# Patient Record
Sex: MALE | Race: WHITE | NOT HISPANIC OR LATINO | Employment: OTHER | URBAN - NONMETROPOLITAN AREA
[De-identification: names, ages, dates, MRNs, and addresses within clinical notes are randomized per-mention and may not be internally consistent; named-entity substitution may affect disease eponyms.]

---

## 2017-02-09 DIAGNOSIS — R97.20 ELEVATED PROSTATE SPECIFIC ANTIGEN (PSA): Primary | ICD-10-CM

## 2017-02-09 LAB — PSA SERPL-MCNC: 5.31 NG/ML (ref 0–4)

## 2017-02-14 ENCOUNTER — RESULTS ENCOUNTER (OUTPATIENT)
Dept: UROLOGY | Facility: CLINIC | Age: 77
End: 2017-02-14

## 2017-02-14 DIAGNOSIS — R97.20 ELEVATED PROSTATE SPECIFIC ANTIGEN (PSA): ICD-10-CM

## 2017-02-16 ENCOUNTER — OFFICE VISIT (OUTPATIENT)
Dept: UROLOGY | Facility: CLINIC | Age: 77
End: 2017-02-16

## 2017-02-16 VITALS
TEMPERATURE: 97.9 F | BODY MASS INDEX: 24.08 KG/M2 | SYSTOLIC BLOOD PRESSURE: 120 MMHG | WEIGHT: 162.6 LBS | HEIGHT: 69 IN | DIASTOLIC BLOOD PRESSURE: 68 MMHG

## 2017-02-16 DIAGNOSIS — R97.20 ELEVATED PROSTATE SPECIFIC ANTIGEN (PSA): Primary | ICD-10-CM

## 2017-02-16 DIAGNOSIS — N13.8 BPH (BENIGN PROSTATIC HYPERTROPHY) WITH URINARY OBSTRUCTION: ICD-10-CM

## 2017-02-16 DIAGNOSIS — N40.1 BPH (BENIGN PROSTATIC HYPERTROPHY) WITH URINARY OBSTRUCTION: ICD-10-CM

## 2017-02-16 LAB
BILIRUB BLD-MCNC: NEGATIVE MG/DL
CLARITY, POC: CLEAR
COLOR UR: YELLOW
GLUCOSE UR STRIP-MCNC: NEGATIVE MG/DL
KETONES UR QL: NEGATIVE
LEUKOCYTE EST, POC: NEGATIVE
NITRITE UR-MCNC: NEGATIVE MG/ML
PH UR: 7 [PH] (ref 5–8)
PROT UR STRIP-MCNC: NEGATIVE MG/DL
RBC # UR STRIP: NEGATIVE /UL
SP GR UR: 1.02 (ref 1–1.03)
UROBILINOGEN UR QL: NORMAL

## 2017-02-16 PROCEDURE — 99213 OFFICE O/P EST LOW 20 MIN: CPT | Performed by: UROLOGY

## 2017-02-16 PROCEDURE — 81003 URINALYSIS AUTO W/O SCOPE: CPT | Performed by: UROLOGY

## 2017-02-16 RX ORDER — HYDROCHLOROTHIAZIDE 25 MG/1
25 TABLET ORAL DAILY
COMMUNITY

## 2017-02-16 RX ORDER — LOVASTATIN 20 MG/1
20 TABLET ORAL NIGHTLY
COMMUNITY

## 2017-02-16 RX ORDER — FOSINOPRIL SODIUM 40 MG/1
40 TABLET ORAL DAILY
COMMUNITY

## 2017-02-16 RX ORDER — ASPIRIN 81 MG/1
81 TABLET ORAL EVERY OTHER DAY
COMMUNITY

## 2017-02-16 NOTE — PROGRESS NOTES
Subjective    Mr. Granados is 76 y.o. male    CHIEF COMPLAINT: Elevated PSA    The patient comes back today with his family for follow-up of elevated PSA he had a biopsy back in August of last year that showed benign disease since then he is had no significant change in his symptoms no urgency no frequency no gross hematuria no flank pain a repeat PSA is 5.3 so basically stable from prebiopsy    We are also following him for BPH he is had no worsening of his symptoms since we have last seen he again no flank pain no gross hematuria no urinary tract infections no significant change in urgency frequency etc. he is not on any medications from for his prostate does not feel that he needs anything at this time  History of Present Illness      The following portions of the patient's history were reviewed and updated as appropriate: allergies, current medications, past family history, past medical history, past social history, past surgical history and problem list.    Review of Systems   Constitutional: Negative for chills and fever.   Gastrointestinal: Negative for abdominal pain, anal bleeding and blood in stool.   Genitourinary: Negative for difficulty urinating, flank pain, frequency, hematuria, penile pain, penile swelling and urgency.         Current Outpatient Prescriptions:   •  aspirin 81 MG EC tablet, Take 81 mg by mouth Daily., Disp: , Rfl:   •  Calcium Citrate-Vitamin D (CALCIUM + D PO), Take  by mouth., Disp: , Rfl:   •  Cholecalciferol (VITAMIN D3) 1000 UNITS capsule, Take  by mouth., Disp: , Rfl:   •  Cyanocobalamin (VITAMIN B-12 IJ), Inject  as directed., Disp: , Rfl:   •  fosinopril (MONOPRIL) 40 MG tablet, Take 40 mg by mouth Daily., Disp: , Rfl:   •  hydrochlorothiazide (HYDRODIURIL) 25 MG tablet, Take 25 mg by mouth Daily., Disp: , Rfl:   •  LEVOTHYROXINE SODIUM PO, Take  by mouth., Disp: , Rfl:   •  lovastatin (MEVACOR) 20 MG tablet, Take 20 mg by mouth Every Night., Disp: , Rfl:   •  Multiple  "Vitamins-Minerals (OCUVITE PO), Take  by mouth., Disp: , Rfl:     Past Medical History   Diagnosis Date   • Arthritis    • BPH (benign prostatic hypertrophy)    • Elevated PSA    • Hyperlipemia    • Hypertension    • Hypothyroidism    • Impotence of organic origin    • Pernicious anemia        Past Surgical History   Procedure Laterality Date   • Cataract extraction     • Splenectomy     • Hernia repair     • Colonoscopy         Social History     Social History   • Marital status: Unknown     Spouse name: N/A   • Number of children: N/A   • Years of education: N/A     Social History Main Topics   • Smoking status: Never Smoker   • Smokeless tobacco: None   • Alcohol use No   • Drug use: None   • Sexual activity: Not Asked     Other Topics Concern   • None     Social History Narrative   • None       Family History   Problem Relation Age of Onset   • No Known Problems Father    • No Known Problems Mother        Objective    Visit Vitals   • /68   • Temp 97.9 °F (36.6 °C)   • Ht 69\" (175.3 cm)   • Wt 162 lb 9.6 oz (73.8 kg)   • BMI 24.01 kg/m2       Physical Exam      Orders Only on 02/09/2017   Component Date Value Ref Range Status   • PSA 02/09/2017 5.310* 0.000 - 4.000 ng/mL Final       Results for orders placed or performed in visit on 02/16/17   POC Urinalysis Dipstick, Automated   Result Value Ref Range    Color Yellow Yellow, Straw, Dark Yellow, [place]    Clarity, UA Clear Clear    Glucose, UA Negative Negative, 1000 mg/dL (3+) mg/dL    Bilirubin Negative Negative    Ketones, UA Negative Negative    Specific Gravity  1.020 1.005 - 1.030    Blood, UA Negative Negative    pH, Urine 7.0 5.0 - 8.0    Protein, POC Negative Negative mg/dL    Urobilinogen, UA Normal Normal    Leukocytes Negative Negative    Nitrite, UA Negative Negative       Assessment and Plan    Maximo was seen today for elevated psa.    Diagnoses and all orders for this visit:    Elevated prostate specific antigen (PSA)  -     POC Urinalysis " Dipstick, Automated    BPH (benign prostatic hypertrophy) with urinary obstruction    Plan--I discussed the findings again with the patient his family we will see him back here in 6 months with a repeat PSA assuming this is stable does not think we need to yearly exam after that he was currently as he had no further questions.    This is voiding habits should worry worsen then he will let me know when certainly start him on some medication for his prostate    EMR Dragon/Transcription disclaimer:  Much of this encounter note is an electronic transcription/translation of spoken language to printed text. The electronic translation of spoken language may permit erroneous, or at times, nonsensical words or phrases to be inadvertently transcribed; although I have reviewed the note for such errors, some may still exist.

## 2017-02-21 ENCOUNTER — RESULTS ENCOUNTER (OUTPATIENT)
Dept: UROLOGY | Facility: CLINIC | Age: 77
End: 2017-02-21

## 2017-02-21 DIAGNOSIS — R97.20 ELEVATED PROSTATE SPECIFIC ANTIGEN (PSA): ICD-10-CM

## 2017-05-22 ENCOUNTER — TELEPHONE (OUTPATIENT)
Dept: UROLOGY | Facility: CLINIC | Age: 77
End: 2017-05-22

## 2017-08-10 LAB — PSA SERPL-MCNC: 5.58 NG/ML (ref 0–4)

## 2017-08-17 ENCOUNTER — OFFICE VISIT (OUTPATIENT)
Dept: UROLOGY | Facility: CLINIC | Age: 77
End: 2017-08-17

## 2017-08-17 VITALS
BODY MASS INDEX: 25.05 KG/M2 | SYSTOLIC BLOOD PRESSURE: 116 MMHG | WEIGHT: 159.6 LBS | DIASTOLIC BLOOD PRESSURE: 76 MMHG | HEIGHT: 67 IN | TEMPERATURE: 98.4 F

## 2017-08-17 DIAGNOSIS — R97.20 ELEVATED PROSTATE SPECIFIC ANTIGEN (PSA): Primary | ICD-10-CM

## 2017-08-17 DIAGNOSIS — N40.1 BPH (BENIGN PROSTATIC HYPERTROPHY) WITH URINARY OBSTRUCTION: ICD-10-CM

## 2017-08-17 DIAGNOSIS — N13.8 BPH (BENIGN PROSTATIC HYPERTROPHY) WITH URINARY OBSTRUCTION: ICD-10-CM

## 2017-08-17 LAB
BILIRUB BLD-MCNC: NEGATIVE MG/DL
CLARITY, POC: CLEAR
COLOR UR: YELLOW
GLUCOSE UR STRIP-MCNC: NEGATIVE MG/DL
KETONES UR QL: NEGATIVE
LEUKOCYTE EST, POC: NEGATIVE
NITRITE UR-MCNC: NEGATIVE MG/ML
PH UR: 6 [PH] (ref 5–8)
PROT UR STRIP-MCNC: NEGATIVE MG/DL
RBC # UR STRIP: NEGATIVE /UL
SP GR UR: 1.02 (ref 1–1.03)
UROBILINOGEN UR QL: NORMAL

## 2017-08-17 PROCEDURE — 99213 OFFICE O/P EST LOW 20 MIN: CPT | Performed by: UROLOGY

## 2017-08-17 PROCEDURE — 81003 URINALYSIS AUTO W/O SCOPE: CPT | Performed by: UROLOGY

## 2017-08-17 NOTE — PROGRESS NOTES
Subjective    Mr. Granados is 76 y.o. male    CHIEF COMPLAINT: Elevated PSA    The patient comes back today following a biopsy for prostate elevation of the PSA this was done about a year ago now at that time his PSA was around 5.2 PSA today is 5.6.    He denies any significant changes in his symptoms no gross materia no flank pain no urgency frequency nocturia or any change in them he is not any medications for his prostate is had no urinary tract infections symptoms of flank pain etc.      History of Present Illness      The following portions of the patient's history were reviewed and updated as appropriate: allergies, current medications, past family history, past medical history, past social history, past surgical history and problem list.    Review of Systems   Constitutional: Negative.  Negative for chills and fever.   Gastrointestinal: Negative for abdominal distention, abdominal pain, anal bleeding, blood in stool and nausea.   Genitourinary: Negative for difficulty urinating, dysuria, flank pain, frequency, hematuria and urgency.   Psychiatric/Behavioral: Negative.  Negative for agitation and confusion.         Current Outpatient Prescriptions:   •  aspirin 81 MG EC tablet, Take 81 mg by mouth Daily., Disp: , Rfl:   •  Calcium Citrate-Vitamin D (CALCIUM + D PO), Take  by mouth., Disp: , Rfl:   •  Cholecalciferol (VITAMIN D3) 1000 UNITS capsule, Take  by mouth., Disp: , Rfl:   •  Cyanocobalamin (VITAMIN B-12 IJ), Inject  as directed., Disp: , Rfl:   •  fosinopril (MONOPRIL) 40 MG tablet, Take 40 mg by mouth Daily., Disp: , Rfl:   •  hydrochlorothiazide (HYDRODIURIL) 25 MG tablet, Take 25 mg by mouth Daily., Disp: , Rfl:   •  LEVOTHYROXINE SODIUM PO, Take  by mouth., Disp: , Rfl:   •  lovastatin (MEVACOR) 20 MG tablet, Take 20 mg by mouth Every Night., Disp: , Rfl:   •  Multiple Vitamins-Minerals (OCUVITE PO), Take  by mouth., Disp: , Rfl:     Past Medical History:   Diagnosis Date   • Arthritis    • BPH  "(benign prostatic hypertrophy)    • Elevated PSA    • Hyperlipemia    • Hypertension    • Hypothyroidism    • Impotence of organic origin    • Pernicious anemia        Past Surgical History:   Procedure Laterality Date   • CATARACT EXTRACTION     • COLONOSCOPY     • HERNIA REPAIR     • SPLENECTOMY         Social History     Social History   • Marital status: Unknown     Spouse name: N/A   • Number of children: N/A   • Years of education: N/A     Social History Main Topics   • Smoking status: Never Smoker   • Smokeless tobacco: Never Used   • Alcohol use No   • Drug use: Defer   • Sexual activity: Defer     Other Topics Concern   • None     Social History Narrative       Family History   Problem Relation Age of Onset   • No Known Problems Father    • No Known Problems Mother        Objective    /76  Temp 98.4 °F (36.9 °C)  Ht 67\" (170.2 cm)  Wt 159 lb 9.6 oz (72.4 kg)  BMI 25 kg/m2    Physical Exam      Office Visit on 02/16/2017   Component Date Value Ref Range Status   • Color 02/16/2017 Yellow  Yellow, Straw, Dark Yellow, [place] Final   • Clarity, UA 02/16/2017 Clear  Clear Final   • Glucose, UA 02/16/2017 Negative  Negative, 1000 mg/dL (3+) mg/dL Final   • Bilirubin 02/16/2017 Negative  Negative Final   • Ketones, UA 02/16/2017 Negative  Negative Final   • Specific Gravity  02/16/2017 1.020  1.005 - 1.030 Final   • Blood, UA 02/16/2017 Negative  Negative Final   • pH, Urine 02/16/2017 7.0  5.0 - 8.0 Final   • Protein, POC 02/16/2017 Negative  Negative mg/dL Final   • Urobilinogen, UA 02/16/2017 Normal  Normal Final   • Leukocytes 02/16/2017 Negative  Negative Final   • Nitrite, UA 02/16/2017 Negative  Negative Final       Results for orders placed or performed in visit on 08/17/17   POC Urinalysis Dipstick, Automated   Result Value Ref Range    Color Yellow Yellow, Straw, Dark Yellow, [place]    Clarity, UA Clear Clear    Glucose, UA Negative Negative, 1000 mg/dL (3+) mg/dL    Bilirubin Negative Negative "    Ketones, UA Negative Negative    Specific Gravity  1.020 1.005 - 1.030    Blood, UA Negative Negative    pH, Urine 6.0 5.0 - 8.0    Protein, POC Negative Negative mg/dL    Urobilinogen, UA Normal Normal    Leukocytes Negative Negative    Nitrite, UA Negative Negative       Assessment and Plan    Maximo was seen today for elevated psa.    Diagnoses and all orders for this visit:    Elevated prostate specific antigen (PSA)  -     POC Urinalysis Dipstick, Automated  -     PSA; Future    BPH (benign prostatic hypertrophy) with urinary obstruction  -     POC Urinalysis Dipstick, Automated  Plan--I discussed the PSA with the patient his wife is a little bit higher than it was on the biopsy for really just a minimal amount I do not think anything significant discussed just continued observation with repeat PSA in 6 months I think both of them are comfortable with this and had no further questions at this time.    From his BPH symptoms of patient worsen we can certainly increase his or start him on some Flomax he will let me know

## 2017-08-22 ENCOUNTER — RESULTS ENCOUNTER (OUTPATIENT)
Dept: UROLOGY | Facility: CLINIC | Age: 77
End: 2017-08-22

## 2017-08-22 DIAGNOSIS — R97.20 ELEVATED PROSTATE SPECIFIC ANTIGEN (PSA): ICD-10-CM

## 2018-02-12 LAB — PSA SERPL-MCNC: 6.71 NG/ML (ref 0–4)

## 2018-02-19 ENCOUNTER — OFFICE VISIT (OUTPATIENT)
Dept: UROLOGY | Facility: CLINIC | Age: 78
End: 2018-02-19

## 2018-02-19 VITALS
BODY MASS INDEX: 24.05 KG/M2 | SYSTOLIC BLOOD PRESSURE: 140 MMHG | HEIGHT: 69 IN | DIASTOLIC BLOOD PRESSURE: 68 MMHG | TEMPERATURE: 98.7 F | WEIGHT: 162.4 LBS

## 2018-02-19 DIAGNOSIS — R97.20 ELEVATED PROSTATE SPECIFIC ANTIGEN (PSA): Primary | ICD-10-CM

## 2018-02-19 DIAGNOSIS — N40.1 BENIGN PROSTATIC HYPERPLASIA WITH LOWER URINARY TRACT SYMPTOMS, SYMPTOM DETAILS UNSPECIFIED: ICD-10-CM

## 2018-02-19 LAB
BILIRUB BLD-MCNC: NEGATIVE MG/DL
CLARITY, POC: CLEAR
COLOR UR: YELLOW
GLUCOSE UR STRIP-MCNC: NEGATIVE MG/DL
KETONES UR QL: NEGATIVE
LEUKOCYTE EST, POC: ABNORMAL
NITRITE UR-MCNC: NEGATIVE MG/ML
PH UR: 6.5 [PH] (ref 5–8)
PROT UR STRIP-MCNC: NEGATIVE MG/DL
RBC # UR STRIP: NEGATIVE /UL
SP GR UR: 1.02 (ref 1–1.03)
UROBILINOGEN UR QL: NORMAL

## 2018-02-19 PROCEDURE — 99214 OFFICE O/P EST MOD 30 MIN: CPT | Performed by: UROLOGY

## 2018-02-19 PROCEDURE — 81003 URINALYSIS AUTO W/O SCOPE: CPT | Performed by: UROLOGY

## 2018-02-19 NOTE — PROGRESS NOTES
Subjective    Mr. Granados is 77 y.o. male    CHIEF COMPLAINT: Elevated PSA    The patient comes back today for follow-up of elevated PSA he did have a biopsy about a year and a half ago now that was benign at that time his PSA was in the low 5 range and is now 6.7.    He denies any indigestion change in his BPH symptoms such as gross hematuria no flank pain no recurrent urinary tract infections he does not take any medications for his prostate again no significant changes since we have last seen him      History of Present Illness      The following portions of the patient's history were reviewed and updated as appropriate: allergies, current medications, past family history, past medical history, past social history, past surgical history and problem list.    Review of Systems   Constitutional: Negative.  Negative for chills and fever.   Gastrointestinal: Negative for abdominal distention, abdominal pain, anal bleeding, blood in stool and nausea.   Genitourinary: Negative for difficulty urinating, dysuria, flank pain, frequency, hematuria and urgency.   Psychiatric/Behavioral: Negative.  Negative for agitation and confusion.         Current Outpatient Prescriptions:   •  aspirin 81 MG EC tablet, Take 81 mg by mouth Daily., Disp: , Rfl:   •  Calcium Citrate-Vitamin D (CALCIUM + D PO), Take  by mouth., Disp: , Rfl:   •  Cholecalciferol (VITAMIN D3) 1000 UNITS capsule, Take  by mouth., Disp: , Rfl:   •  Cyanocobalamin (VITAMIN B-12 IJ), Inject  as directed., Disp: , Rfl:   •  fosinopril (MONOPRIL) 40 MG tablet, Take 40 mg by mouth Daily., Disp: , Rfl:   •  hydrochlorothiazide (HYDRODIURIL) 25 MG tablet, Take 25 mg by mouth Daily., Disp: , Rfl:   •  LEVOTHYROXINE SODIUM PO, Take  by mouth., Disp: , Rfl:   •  lovastatin (MEVACOR) 20 MG tablet, Take 20 mg by mouth Every Night., Disp: , Rfl:   •  Multiple Vitamins-Minerals (OCUVITE PO), Take  by mouth., Disp: , Rfl:     Past Medical History:   Diagnosis Date   • Arthritis   "  • BPH (benign prostatic hypertrophy)    • Elevated PSA    • Hyperlipemia    • Hypertension    • Hypothyroidism    • Impotence of organic origin    • Pernicious anemia        Past Surgical History:   Procedure Laterality Date   • CATARACT EXTRACTION     • COLONOSCOPY     • HERNIA REPAIR     • SPLENECTOMY         Social History     Social History   • Marital status: Unknown     Spouse name: N/A   • Number of children: N/A   • Years of education: N/A     Social History Main Topics   • Smoking status: Never Smoker   • Smokeless tobacco: Never Used   • Alcohol use No   • Drug use: Defer   • Sexual activity: Defer     Other Topics Concern   • None     Social History Narrative       Family History   Problem Relation Age of Onset   • No Known Problems Father    • No Known Problems Mother        Objective    /68  Temp 98.7 °F (37.1 °C)  Ht 175.3 cm (69\")  Wt 73.7 kg (162 lb 6.4 oz)  BMI 23.98 kg/m2    Physical Exam   Constitutional: He is oriented to person, place, and time. He appears well-developed and well-nourished.   Pulmonary/Chest: Effort normal.   Abdominal: Soft. He exhibits no distension and no mass. There is no tenderness. There is no rebound and no guarding. No hernia.   Genitourinary: Penis normal. Rectal exam shows no mass, no tenderness and anal tone normal. Enlarged: for the age of the patient. Right testis shows no mass, no swelling and no tenderness. Left testis shows no mass, no swelling and no tenderness. No hypospadias. No discharge found.   Genitourinary Comments:  The urethral meatus normal in position without evidence of stricture. Epididymis without mass or tenderness. Vas Deferens is palpably normal.Anus and perineum without mass or tenderness. The prostate is approximately 35 ml. It is Symmetric, with a Soft consistency. There are no nodules present. . The seminal vesicles are Not palpable due to the size of the prostate.     Neurological: He is alert and oriented to person, place, and " time.   Vitals reviewed.        Results Encounter on 08/22/2017   Component Date Value Ref Range Status   • PSA 02/12/2018 6.710* 0.000 - 4.000 ng/mL Final       Results for orders placed or performed in visit on 02/19/18   POC Urinalysis Dipstick, Automated   Result Value Ref Range    Color Yellow Yellow, Straw, Dark Yellow, [place]    Clarity, UA Clear Clear    Glucose, UA Negative Negative, 1000 mg/dL (3+) mg/dL    Bilirubin Negative Negative    Ketones, UA Negative Negative    Specific Gravity  1.020 1.005 - 1.030    Blood, UA Negative Negative    pH, Urine 6.5 5.0 - 8.0    Protein, POC Negative Negative mg/dL    Urobilinogen, UA Normal Normal    Leukocytes Trace (A) Negative    Nitrite, UA Negative Negative       Assessment and Plan    Maximo was seen today for elevated psa.    Diagnoses and all orders for this visit:    Elevated prostate specific antigen (PSA)  -     POC Urinalysis Dipstick, Automated  -     PSA, Total & Free; Future    Benign prostatic hyperplasia with lower urinary tract symptoms, symptom details unspecified  Plan--I discussed the rise in the PSA with the patient and his wife again is a little bit higher than it has been before he is 77 years old and and we have done a biopsy relatively recently.    We have discussed the options and I suggested we check him again in 6 months if it continues to rise and we might want to do a MRI and a possible fused biopsy if necessary.    I discussed this with them they are in agreement with that and had no further questions at this time so we will seen back in a  6 months with a PSA free and total.    If his BPH symptoms change or worsen once again he will call as well

## 2018-02-24 ENCOUNTER — RESULTS ENCOUNTER (OUTPATIENT)
Dept: UROLOGY | Facility: CLINIC | Age: 78
End: 2018-02-24

## 2018-02-24 DIAGNOSIS — R97.20 ELEVATED PROSTATE SPECIFIC ANTIGEN (PSA): ICD-10-CM

## 2018-08-16 LAB
PSA FREE MFR SERPL: 11.4 %
PSA FREE SERPL-MCNC: 1 NG/ML
PSA SERPL-MCNC: 8.8 NG/ML (ref 0–4)

## 2018-08-21 ENCOUNTER — OFFICE VISIT (OUTPATIENT)
Dept: UROLOGY | Facility: CLINIC | Age: 78
End: 2018-08-21

## 2018-08-21 VITALS — HEIGHT: 65 IN | BODY MASS INDEX: 25.86 KG/M2 | WEIGHT: 155.2 LBS | TEMPERATURE: 98.2 F

## 2018-08-21 DIAGNOSIS — R97.20 ELEVATED PROSTATE SPECIFIC ANTIGEN (PSA): Primary | ICD-10-CM

## 2018-08-21 DIAGNOSIS — N40.1 BENIGN PROSTATIC HYPERPLASIA WITH LOWER URINARY TRACT SYMPTOMS, SYMPTOM DETAILS UNSPECIFIED: ICD-10-CM

## 2018-08-21 PROCEDURE — 81001 URINALYSIS AUTO W/SCOPE: CPT | Performed by: UROLOGY

## 2018-08-21 PROCEDURE — 99214 OFFICE O/P EST MOD 30 MIN: CPT | Performed by: UROLOGY

## 2018-08-21 NOTE — PROGRESS NOTES
Subjective    Mr. Granados is 77 y.o. male    CHIEF COMPLAINT: Elevated PSA    The patient comes back today with his family for an elevated PSA Dr. Linares in [place] had gotten one and it jumped up to 11.4 we have repeated it today and is back down to 8.4 he has had, URI and an infection recently in fact he says he just stop the medication for it although is not had any symptoms of urinary tract infection.    He did have a biopsy now about 2 years ago that was benign his PSA was in the 5-1/2 range then.    He denies any gross hematuria just a minimal amount of frequency urgency is symptoms are stable he is not any medications for his prostate at this time is had no infection stones etc.          History of Present Illness      The following portions of the patient's history were reviewed and updated as appropriate: allergies, current medications, past family history, past medical history, past social history, past surgical history and problem list.    Review of Systems   Constitutional: Negative.  Negative for chills and fever.   Gastrointestinal: Negative for abdominal distention, abdominal pain, blood in stool and nausea.   Genitourinary: Negative for difficulty urinating, dysuria, flank pain, frequency, hematuria and urgency.   Psychiatric/Behavioral: Negative.  Negative for agitation and confusion.         Current Outpatient Prescriptions:   •  aspirin 81 MG EC tablet, Take 81 mg by mouth Daily., Disp: , Rfl:   •  Calcium Citrate-Vitamin D (CALCIUM + D PO), Take  by mouth., Disp: , Rfl:   •  Cholecalciferol (VITAMIN D3) 1000 UNITS capsule, Take  by mouth., Disp: , Rfl:   •  Cyanocobalamin (VITAMIN B-12 IJ), Inject  as directed., Disp: , Rfl:   •  fosinopril (MONOPRIL) 40 MG tablet, Take 40 mg by mouth Daily., Disp: , Rfl:   •  hydrochlorothiazide (HYDRODIURIL) 25 MG tablet, Take 25 mg by mouth Daily., Disp: , Rfl:   •  LEVOTHYROXINE SODIUM PO, Take  by mouth., Disp: , Rfl:   •  lovastatin (MEVACOR) 20 MG  "tablet, Take 20 mg by mouth Every Night., Disp: , Rfl:   •  Multiple Vitamins-Minerals (OCUVITE PO), Take  by mouth., Disp: , Rfl:     Past Medical History:   Diagnosis Date   • Arthritis    • BPH (benign prostatic hypertrophy)    • Elevated PSA    • Hyperlipemia    • Hypertension    • Hypothyroidism    • Impotence of organic origin    • Pernicious anemia        Past Surgical History:   Procedure Laterality Date   • CATARACT EXTRACTION     • COLONOSCOPY     • HERNIA REPAIR     • SPLENECTOMY         Social History     Social History   • Marital status: Unknown     Social History Main Topics   • Smoking status: Never Smoker   • Smokeless tobacco: Never Used   • Alcohol use No   • Drug use: Unknown   • Sexual activity: Defer     Other Topics Concern   • Not on file       Family History   Problem Relation Age of Onset   • No Known Problems Father    • No Known Problems Mother    • Prostate cancer Neg Hx        Objective    Temp 98.2 °F (36.8 °C)   Ht 165.1 cm (65\")   Wt 70.4 kg (155 lb 3.2 oz)   BMI 25.83 kg/m²     Physical Exam      Results Encounter on 02/24/2018   Component Date Value Ref Range Status   • PSA 08/14/2018 8.8* 0.0 - 4.0 ng/mL Final    Comment: Roche ECLIA methodology.  According to the American [employer] Association, Serum PSA should  decrease and remain at undetectable levels after radical  prostatectomy. The AUA defines biochemical recurrence as an initial  PSA value 0.2 ng/mL or greater followed by a subsequent confirmatory  PSA value 0.2 ng/mL or greater.  Values obtained with different assay methods or kits cannot be used  interchangeably. Results cannot be interpreted as absolute evidence  of the presence or absence of malignant disease.     • PSA, Free 08/14/2018 1.00  N/A ng/mL Final    Roche ECLIA methodology.   • PSA, Free % 08/14/2018 11.4  % Final    Comment: The table below lists the probability of prostate cancer for  men with non-suspicious ANABELLA results and total PSA between  4 and 10 " ng/mL, by patient age (Watson et al, JOEY 1998,  279:1542).                    % Free PSA       50-64 yr        65-75 yr                    0.00-10.00%        56%             55%                   10.01-15.00%        24%             35%                   15.01-20.00%        17%             23%                   20.01-25.00%        10%             20%                        >25.00%         5%              9%  Please note:  Watson et al did not make specific                recommendations regarding the use of                percent free PSA for any other population                of men.         Results for orders placed or performed in visit on 08/21/18   POC Urinalysis Dipstick, Multipro   Result Value Ref Range    Color Yellow Yellow, Straw, Dark Yellow, [place]    Clarity, UA Clear Clear    Glucose, UA Negative Negative, 1000 mg/dL (3+) mg/dL    Bilirubin Negative Negative    Ketones, UA Negative Negative    Specific Gravity  1.020 1.005 - 1.030    Blood, UA Negative Negative    pH, Urine 7.0 5.0 - 8.0    Protein, POC Negative Negative mg/dL    Urobilinogen, UA Normal Normal    Nitrite, UA Negative Negative    Leukocytes Negative Negative       Assessment and Plan    Maximo was seen today for elevated psa.    Diagnoses and all orders for this visit:    Elevated prostate specific antigen (PSA)  -     POC Urinalysis Dipstick, Multipro  -     PSA DIAGNOSTIC; Future    Benign prostatic hyperplasia with lower urinary tract symptoms, symptom details unspecified    Plan--I discussed the PSA with this patient and his wife again I think that may be some inflammation and a platelet roll going to suggest is that we seen back again in 3 months as he was PSA will come back down to baseline.    If it will not come and go to suggest probably an MRI and possible repeat biopsy there comfortable this he had no further questions

## 2018-11-19 ENCOUNTER — RESULTS ENCOUNTER (OUTPATIENT)
Dept: UROLOGY | Facility: CLINIC | Age: 78
End: 2018-11-19

## 2018-11-19 DIAGNOSIS — R97.20 ELEVATED PROSTATE SPECIFIC ANTIGEN (PSA): ICD-10-CM

## 2018-11-19 LAB — PSA SERPL-MCNC: 7.37 NG/ML (ref 0–4)

## 2018-11-27 ENCOUNTER — OFFICE VISIT (OUTPATIENT)
Dept: UROLOGY | Facility: CLINIC | Age: 78
End: 2018-11-27

## 2018-11-27 VITALS — HEIGHT: 67 IN | BODY MASS INDEX: 25.02 KG/M2 | TEMPERATURE: 98.1 F | WEIGHT: 159.4 LBS

## 2018-11-27 DIAGNOSIS — N40.1 BENIGN PROSTATIC HYPERPLASIA WITH LOWER URINARY TRACT SYMPTOMS, SYMPTOM DETAILS UNSPECIFIED: ICD-10-CM

## 2018-11-27 DIAGNOSIS — N52.9 IMPOTENCE DUE TO ERECTILE DYSFUNCTION: ICD-10-CM

## 2018-11-27 DIAGNOSIS — R97.20 ELEVATED PROSTATE SPECIFIC ANTIGEN (PSA): Primary | ICD-10-CM

## 2018-11-27 LAB
BILIRUB BLD-MCNC: NEGATIVE MG/DL
CLARITY, POC: CLEAR
COLOR UR: YELLOW
GLUCOSE UR STRIP-MCNC: NEGATIVE MG/DL
KETONES UR QL: NEGATIVE
LEUKOCYTE EST, POC: NEGATIVE
NITRITE UR-MCNC: NEGATIVE MG/ML
PH UR: 7 [PH] (ref 5–8)
PROT UR STRIP-MCNC: NEGATIVE MG/DL
RBC # UR STRIP: NEGATIVE /UL
SP GR UR: 1.01 (ref 1–1.03)
UROBILINOGEN UR QL: NORMAL

## 2018-11-27 PROCEDURE — 81001 URINALYSIS AUTO W/SCOPE: CPT | Performed by: UROLOGY

## 2018-11-27 PROCEDURE — 99214 OFFICE O/P EST MOD 30 MIN: CPT | Performed by: UROLOGY

## 2018-11-27 NOTE — PROGRESS NOTES
Subjective    Mr. Granados is 78 y.o. male    CHIEF COMPLAINT: BPH    Patient comes back today for follow-up of BPH as a way score today is 10 he denies any significant change in his voiding habits he is doing well and stable or gross hematuria flank pain etc.        We have also recently followed him for an elevated PSA he had one from Dr. Linares and it jumped up to 11 however repeat came back down and then also repeat today is around 7.7 so pretty much in the same ballpark as been running for some time.    He did have a props a biopsy about 2 years ago that was benign.    He also is having some difficulties with ED which is a new difficult problem for him I just cannot maintain his erection as long as he would like he has used Viagra in the past but I he was looking for something a little bit cheaper but it did seem to work pretty well and I think he is been taking the 50 mg dose although he has 100 mg pills      History of Present Illness      The following portions of the patient's history were reviewed and updated as appropriate: allergies, current medications, past family history, past medical history, past social history, past surgical history and problem list.    Review of Systems   Constitutional: Negative.  Negative for chills and fever.   Gastrointestinal: Negative for abdominal distention, abdominal pain, anal bleeding, blood in stool and nausea.   Genitourinary: Negative for difficulty urinating, dysuria, flank pain, frequency, hematuria and urgency.   Psychiatric/Behavioral: Negative.  Negative for agitation and confusion.         Current Outpatient Medications:   •  aspirin 81 MG EC tablet, Take 81 mg by mouth Daily., Disp: , Rfl:   •  Calcium Citrate-Vitamin D (CALCIUM + D PO), Take  by mouth., Disp: , Rfl:   •  Cholecalciferol (VITAMIN D3) 1000 UNITS capsule, Take  by mouth., Disp: , Rfl:   •  Cyanocobalamin (VITAMIN B-12 IJ), Inject  as directed., Disp: , Rfl:   •  fosinopril (MONOPRIL) 40 MG  "tablet, Take 40 mg by mouth Daily., Disp: , Rfl:   •  hydrochlorothiazide (HYDRODIURIL) 25 MG tablet, Take 25 mg by mouth Daily., Disp: , Rfl:   •  LEVOTHYROXINE SODIUM PO, Take  by mouth., Disp: , Rfl:   •  lovastatin (MEVACOR) 20 MG tablet, Take 20 mg by mouth Every Night., Disp: , Rfl:   •  Multiple Vitamins-Minerals (OCUVITE PO), Take  by mouth., Disp: , Rfl:     Past Medical History:   Diagnosis Date   • Arthritis    • BPH (benign prostatic hypertrophy)    • Elevated PSA    • Hyperlipemia    • Hypertension    • Hypothyroidism    • Impotence of organic origin    • Pernicious anemia        Past Surgical History:   Procedure Laterality Date   • CATARACT EXTRACTION     • COLONOSCOPY     • HERNIA REPAIR     • SPLENECTOMY         Social History     Socioeconomic History   • Marital status: Unknown     Spouse name: Not on file   • Number of children: Not on file   • Years of education: Not on file   • Highest education level: Not on file   Tobacco Use   • Smoking status: Never Smoker   • Smokeless tobacco: Never Used   Substance and Sexual Activity   • Alcohol use: No   • Drug use: Defer   • Sexual activity: Defer       Family History   Problem Relation Age of Onset   • No Known Problems Father    • No Known Problems Mother    • Prostate cancer Neg Hx        Objective    Temp 98.1 °F (36.7 °C)   Ht 170.2 cm (67\")   Wt 72.3 kg (159 lb 6.4 oz)   BMI 24.97 kg/m²     Physical Exam      Results Encounter on 11/19/2018   Component Date Value Ref Range Status   • PSA 11/19/2018 7.370* 0.000 - 4.000 ng/mL Final       Results for orders placed or performed in visit on 11/27/18   POC Urinalysis Dipstick, Multipro   Result Value Ref Range    Color Yellow Yellow, Straw, Dark Yellow, [place]    Clarity, UA Clear Clear    Glucose, UA Negative Negative, 1000 mg/dL (3+) mg/dL    Bilirubin Negative Negative    Ketones, UA Negative Negative    Specific Gravity  1.010 1.005 - 1.030    Blood, UA Negative Negative    pH, Urine 7.0 5.0 - " 8.0    Protein, POC Negative Negative mg/dL    Urobilinogen, UA Normal Normal    Nitrite, UA Negative Negative    Leukocytes Negative Negative       Assessment and Plan    Diagnoses and all orders for this visit:    Elevated prostate specific antigen (PSA)  -     POC Urinalysis Dipstick, Multipro  -     PSA DIAGNOSTIC; Future    Benign prostatic hyperplasia with lower urinary tract symptoms, symptom details unspecified    Impotence due to erectile dysfunction    Plan--I discussed with the patient and his wife the PSA I told him really at this point this seems to have been stable when a relatively recent biopsies at benign so I feel comfortable just seeing him back again in a year with a PSA as I told him I cannot exclude a diagnosis of prostate cancer but certainly this is appropriate treatment I think again this day at age I think there both comfortable with that.    Far as his ED I gave him prescription again for Viagra 100 mg suggested that he call around anything find a cheaper somewhere or maybe even seminal to [facility] if necessary.    Far as his BPH his symptoms are stable he does not need any medications for that at this time.    Again we will seen back in a year with a PSA call sooner as needed

## 2019-11-27 ENCOUNTER — RESULTS ENCOUNTER (OUTPATIENT)
Dept: UROLOGY | Facility: CLINIC | Age: 79
End: 2019-11-27

## 2019-11-27 DIAGNOSIS — R97.20 ELEVATED PROSTATE SPECIFIC ANTIGEN (PSA): ICD-10-CM

## 2020-01-28 DIAGNOSIS — N40.1 BENIGN PROSTATIC HYPERPLASIA WITH LOWER URINARY TRACT SYMPTOMS, SYMPTOM DETAILS UNSPECIFIED: ICD-10-CM

## 2020-01-28 DIAGNOSIS — R97.20 ELEVATED PROSTATE SPECIFIC ANTIGEN (PSA): ICD-10-CM

## 2020-01-28 DIAGNOSIS — R97.20 ELEVATED PROSTATE SPECIFIC ANTIGEN (PSA): Primary | ICD-10-CM

## 2020-01-29 LAB — PSA SERPL-MCNC: 12.5 NG/ML (ref 0–4)

## 2020-02-06 ENCOUNTER — OFFICE VISIT (OUTPATIENT)
Dept: UROLOGY | Facility: CLINIC | Age: 80
End: 2020-02-06

## 2020-02-06 VITALS — TEMPERATURE: 97.2 F | WEIGHT: 163 LBS | HEIGHT: 67 IN | BODY MASS INDEX: 25.58 KG/M2

## 2020-02-06 DIAGNOSIS — N52.9 IMPOTENCE DUE TO ERECTILE DYSFUNCTION: ICD-10-CM

## 2020-02-06 DIAGNOSIS — I10 ESSENTIAL HYPERTENSION: ICD-10-CM

## 2020-02-06 DIAGNOSIS — R97.20 ELEVATED PROSTATE SPECIFIC ANTIGEN (PSA): Primary | ICD-10-CM

## 2020-02-06 LAB
BILIRUB BLD-MCNC: NEGATIVE MG/DL
CLARITY, POC: CLEAR
COLOR UR: YELLOW
GLUCOSE UR STRIP-MCNC: NEGATIVE MG/DL
KETONES UR QL: NEGATIVE
LEUKOCYTE EST, POC: NEGATIVE
NITRITE UR-MCNC: NEGATIVE MG/ML
PH UR: 6.5 [PH] (ref 5–8)
PROT UR STRIP-MCNC: NEGATIVE MG/DL
RBC # UR STRIP: NEGATIVE /UL
SP GR UR: 1.02 (ref 1–1.03)
UROBILINOGEN UR QL: NORMAL

## 2020-02-06 PROCEDURE — 81003 URINALYSIS AUTO W/O SCOPE: CPT | Performed by: UROLOGY

## 2020-02-06 PROCEDURE — 99213 OFFICE O/P EST LOW 20 MIN: CPT | Performed by: UROLOGY

## 2020-02-07 PROBLEM — I10 ESSENTIAL HYPERTENSION: Status: ACTIVE | Noted: 2020-02-07

## 2020-02-17 ENCOUNTER — RESULTS ENCOUNTER (OUTPATIENT)
Dept: UROLOGY | Facility: CLINIC | Age: 80
End: 2020-02-17

## 2020-02-17 DIAGNOSIS — R97.20 ELEVATED PROSTATE SPECIFIC ANTIGEN (PSA): ICD-10-CM

## 2020-02-19 LAB — PSA SERPL-MCNC: 11.9 NG/ML (ref 0–4)

## 2020-02-24 NOTE — PROGRESS NOTES
Subjective    Mr. Granados is 79 y.o. male    Chief Complaint: Elevated PSA    History of Present Illness    79-year-old male established patient formerly seen by Dr. Banda follow-up for elevated PSA.  Severity improved down to 11.9 from 12.5.  Timing drawn 2/18/2020.   Context PSA up from 7.37 in November 2018.  He had a prostate biopsy August 2016 which was negative.  Associated symptoms he denies bothersome LUTS, hematuria, or dysuria.  Quality painless.  Onset gradual.      Lab Results   Component Value Date    PSA 11.900 (H) 02/18/2020    PSA 12.500 (H) 01/28/2020    PSA 7.370 (H) 11/19/2018       The following portions of the patient's history were reviewed and updated as appropriate: allergies, current medications, past family history, past medical history, past social history, past surgical history and problem list.    Review of Systems   Constitutional: Negative for chills and fever.   Gastrointestinal: Negative for abdominal pain, anal bleeding and blood in stool.   Genitourinary: Negative for dysuria, frequency, hematuria and urgency.   All other systems reviewed and are negative.        Current Outpatient Medications:   •  aspirin 81 MG EC tablet, Take 81 mg by mouth Daily., Disp: , Rfl:   •  Calcium Citrate-Vitamin D (CALCIUM + D PO), Take  by mouth., Disp: , Rfl:   •  Cholecalciferol (VITAMIN D3) 1000 UNITS capsule, Take  by mouth., Disp: , Rfl:   •  Cyanocobalamin (VITAMIN B-12 IJ), Inject  as directed., Disp: , Rfl:   •  fosinopril (MONOPRIL) 40 MG tablet, Take 40 mg by mouth Daily., Disp: , Rfl:   •  hydrochlorothiazide (HYDRODIURIL) 25 MG tablet, Take 25 mg by mouth Daily., Disp: , Rfl:   •  LEVOTHYROXINE SODIUM PO, Take  by mouth., Disp: , Rfl:   •  lovastatin (MEVACOR) 20 MG tablet, Take 20 mg by mouth Every Night., Disp: , Rfl:   •  Multiple Vitamins-Minerals (OCUVITE PO), Take  by mouth., Disp: , Rfl:     Past Medical History:   Diagnosis Date   • Arthritis    • BPH (benign prostatic  "hypertrophy)    • Elevated PSA    • Hyperlipemia    • Hypertension    • Hypothyroidism    • Impotence of organic origin    • Pernicious anemia        Past Surgical History:   Procedure Laterality Date   • CATARACT EXTRACTION     • COLONOSCOPY     • HERNIA REPAIR     • SPLENECTOMY         Social History     Socioeconomic History   • Marital status: Unknown     Spouse name: Not on file   • Number of children: Not on file   • Years of education: Not on file   • Highest education level: Not on file   Tobacco Use   • Smoking status: Never Smoker   • Smokeless tobacco: Never Used   Substance and Sexual Activity   • Alcohol use: No   • Drug use: Defer   • Sexual activity: Defer       Family History   Problem Relation Age of Onset   • No Known Problems Father    • No Known Problems Mother    • Prostate cancer Neg Hx        Objective    Temp 98.2 °F (36.8 °C)   Ht 175.3 cm (69\")   Wt 73.9 kg (163 lb)   BMI 24.07 kg/m²     Physical Exam  Constitutional: Well nourished, Well developed; No apparent distress; Vital reviewed as above  Psychiatric: Appropriate affect; Alert and oriented  Eyes: Unremarkable  Musculoskeletal: Normal gait and station  GI: Abdomen is soft, non-tender  Respiratory: No distress; Unlabored movement; No accessory musculature needed with symmetric movements  Skin: No pallor or diaphoresis  Lymphatic: No adenopathy neck or groin      Results for orders placed or performed in visit on 02/25/20   POC Urinalysis Dipstick, Multipro   Result Value Ref Range    Color Yellow Yellow, Straw, Dark Yellow, [place]    Clarity, UA Clear Clear    Glucose, UA Negative Negative, 1000 mg/dL (3+) mg/dL    Bilirubin Negative Negative    Ketones, UA Negative Negative    Specific Gravity  1.010 1.005 - 1.030    Blood, UA Negative Negative    pH, Urine 7.0 5.0 - 8.0    Protein, POC Negative Negative mg/dL    Urobilinogen, UA Normal Normal    Nitrite, UA Negative Negative    Leukocytes Negative Negative     Assessment and " Plan    Diagnoses and all orders for this visit:    Elevated prostate specific antigen (PSA)  -     POC Urinalysis Dipstick, Multipro  -     MRI Pelvis With & Without Contrast; Future    Essential hypertension    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  I discussed options for repeat prostate biopsy now, doing nothing, close PSA surveillance, getting MRI prostate, or starting finasteride.  I recommended follow-up with me after prostate MRI for consideration of need for repeat prostate biopsy.

## 2020-02-25 ENCOUNTER — OFFICE VISIT (OUTPATIENT)
Dept: UROLOGY | Facility: CLINIC | Age: 80
End: 2020-02-25

## 2020-02-25 VITALS — HEIGHT: 69 IN | TEMPERATURE: 98.2 F | BODY MASS INDEX: 24.14 KG/M2 | WEIGHT: 163 LBS

## 2020-02-25 DIAGNOSIS — R97.20 ELEVATED PROSTATE SPECIFIC ANTIGEN (PSA): Primary | ICD-10-CM

## 2020-02-25 DIAGNOSIS — I10 ESSENTIAL HYPERTENSION: ICD-10-CM

## 2020-02-25 DIAGNOSIS — N40.1 BENIGN PROSTATIC HYPERPLASIA WITH URINARY OBSTRUCTION: ICD-10-CM

## 2020-02-25 DIAGNOSIS — N13.8 BENIGN PROSTATIC HYPERPLASIA WITH URINARY OBSTRUCTION: ICD-10-CM

## 2020-02-25 DIAGNOSIS — N52.9 IMPOTENCE DUE TO ERECTILE DYSFUNCTION: ICD-10-CM

## 2020-02-25 PROCEDURE — 81003 URINALYSIS AUTO W/O SCOPE: CPT | Performed by: UROLOGY

## 2020-02-25 PROCEDURE — 99213 OFFICE O/P EST LOW 20 MIN: CPT | Performed by: UROLOGY

## 2020-03-09 ENCOUNTER — HOSPITAL ENCOUNTER (OUTPATIENT)
Dept: MRI IMAGING | Facility: HOSPITAL | Age: 80
Discharge: HOME | End: 2020-03-09
Admitting: UROLOGY

## 2020-03-09 DIAGNOSIS — R97.20 ELEVATED PROSTATE SPECIFIC ANTIGEN (PSA): ICD-10-CM

## 2020-03-09 LAB — CREAT BLDA-MCNC: 1.1 MG/DL (ref 0.6–1.3)

## 2020-03-09 PROCEDURE — 0 GADOBENATE DIMEGLUMINE 529 MG/ML SOLUTION: Performed by: UROLOGY

## 2020-03-09 PROCEDURE — 72197 MRI PELVIS W/O & W/DYE: CPT

## 2020-03-09 PROCEDURE — A9577 INJ MULTIHANCE: HCPCS | Performed by: UROLOGY

## 2020-03-09 PROCEDURE — 82565 ASSAY OF CREATININE: CPT

## 2020-03-09 RX ADMIN — GADOBENATE DIMEGLUMINE 14 ML: 529 INJECTION, SOLUTION INTRAVENOUS at 09:21

## 2020-03-13 NOTE — PROGRESS NOTES
Subjective    Mr. Granados is 79 y.o. male    Chief Complaint: Elevated PSA    History of Present Illness    79-year-old male established patient follow-up to review prostate MRI done for worsening elevated PSA.  MRI prostate done 3/9/2020 shows a PI-RADS 5 lesion in the right anterior mid transition zone.  Context PSA up from 7.37 in November 2018.  He had a prostate biopsy August 2016 which was negative.  Associated symptoms he denies bothersome LUTS, hematuria, or dysuria.  Quality painless.  Onset gradual.    I independently visualized and reviewed the patient's prior imaging studies today in clinic and discussed the imaging findings with the patient.       The following portions of the patient's history were reviewed and updated as appropriate: allergies, current medications, past family history, past medical history, past social history, past surgical history and problem list.    Review of Systems   Constitutional: Negative for chills and fever.   Gastrointestinal: Negative for abdominal pain, anal bleeding and blood in stool.   Genitourinary: Negative for dysuria, frequency, hematuria and urgency.         Current Outpatient Medications:   •  aspirin 81 MG EC tablet, Take 81 mg by mouth Daily., Disp: , Rfl:   •  Calcium Citrate-Vitamin D (CALCIUM + D PO), Take  by mouth., Disp: , Rfl:   •  Cholecalciferol (VITAMIN D3) 1000 UNITS capsule, Take  by mouth., Disp: , Rfl:   •  Cyanocobalamin (VITAMIN B-12 IJ), Inject  as directed., Disp: , Rfl:   •  fosinopril (MONOPRIL) 40 MG tablet, Take 40 mg by mouth Daily., Disp: , Rfl:   •  hydrochlorothiazide (HYDRODIURIL) 25 MG tablet, Take 25 mg by mouth Daily., Disp: , Rfl:   •  LEVOTHYROXINE SODIUM PO, Take  by mouth., Disp: , Rfl:   •  lovastatin (MEVACOR) 20 MG tablet, Take 20 mg by mouth Every Night., Disp: , Rfl:   •  Multiple Vitamins-Minerals (OCUVITE PO), Take  by mouth., Disp: , Rfl:   •  levoFLOXacin (LEVAQUIN) 500 MG tablet, 1 tab by mouth th evening prior to  "biopsy, Disp: 1 tablet, Rfl: 0  •  sodium phosphate (FLEET) 7-19 GM/118ML enema, Use rectally the morning of biopsy, Disp: 1 enema, Rfl: 0    Past Medical History:   Diagnosis Date   • Arthritis    • BPH (benign prostatic hypertrophy)    • Elevated PSA    • Hyperlipemia    • Hypertension    • Hypothyroidism    • Impotence of organic origin    • Pernicious anemia        Past Surgical History:   Procedure Laterality Date   • CATARACT EXTRACTION     • COLONOSCOPY     • HERNIA REPAIR     • SPLENECTOMY         Social History     Socioeconomic History   • Marital status:      Spouse name: Not on file   • Number of children: Not on file   • Years of education: Not on file   • Highest education level: Not on file   Tobacco Use   • Smoking status: Never Smoker   • Smokeless tobacco: Never Used   Substance and Sexual Activity   • Alcohol use: No   • Drug use: Defer   • Sexual activity: Defer       Family History   Problem Relation Age of Onset   • No Known Problems Father    • No Known Problems Mother    • Prostate cancer Neg Hx        Objective    Temp 97.5 °F (36.4 °C)   Ht 170.2 cm (67\")   Wt 75.8 kg (167 lb 3.2 oz)   BMI 26.19 kg/m²     Physical Exam  Constitutional: Well nourished, Well developed; No apparent distress; Vital reviewed as above  Psychiatric: Appropriate affect; Alert and oriented  Eyes: Unremarkable  Musculoskeletal: Normal gait and station  GI: Abdomen is soft, non-tender  Respiratory: No distress; Unlabored movement; No accessory musculature needed with symmetric movements  Skin: No pallor or diaphoresis  Lymphatic: No adenopathy neck or groin      Results for orders placed or performed in visit on 03/16/20   POC Urinalysis Dipstick, Multipro   Result Value Ref Range    Color Yellow Yellow, Straw, Dark Yellow, [place]    Clarity, UA Clear Clear    Glucose, UA Negative Negative, 1000 mg/dL (3+) mg/dL    Bilirubin Negative Negative    Ketones, UA Negative Negative    Specific Gravity  1.020 1.005 - " 1.030    Blood, UA Negative Negative    pH, Urine 7.0 5.0 - 8.0    Protein, POC Negative Negative mg/dL    Urobilinogen, UA Normal Normal    Nitrite, UA Negative Negative    Leukocytes Trace (A) Negative     Assessment and Plan    Diagnoses and all orders for this visit:    Elevated prostate specific antigen (PSA)  -     POC Urinalysis Dipstick, Multipro  -     levoFLOXacin (LEVAQUIN) 500 MG tablet; 1 tab by mouth th evening prior to biopsy  -     sodium phosphate (FLEET) 7-19 GM/118ML enema; Use rectally the morning of biopsy  -     Case Request; Standing  -     ECG 12 Lead; Future  -     Case Request    Essential hypertension    Other orders  -     Follow Anesthesia Guidelines / Standing Orders; Future  -     Obtain Informed Consent; Future  -     Provide NPO Instructions to Patient; Future  -     Chlorhexidine Skin Prep; Future      Abnormal PI-RADS 5 prostate lesion with elevated PSA concerning for prostate cancer.  History of benign prostate biopsy 2016.  I recommended MRI ultrasound fusion prostate biopsy.  I discussed risks of the procedure including but not limited to infection, bleeding, need for additional procedures, possibly finding/not finding cancer, complications of anesthesia.  Patient voiced understanding provide informed consent to proceed with fusion biopsy 4/3/2020.  He was instructed to hold his aspirin 7 days prior.

## 2020-03-16 ENCOUNTER — OFFICE VISIT (OUTPATIENT)
Dept: UROLOGY | Facility: CLINIC | Age: 80
End: 2020-03-16

## 2020-03-16 VITALS — WEIGHT: 167.2 LBS | BODY MASS INDEX: 26.24 KG/M2 | TEMPERATURE: 97.5 F | HEIGHT: 67 IN

## 2020-03-16 DIAGNOSIS — I10 ESSENTIAL HYPERTENSION: ICD-10-CM

## 2020-03-16 DIAGNOSIS — R97.20 ELEVATED PROSTATE SPECIFIC ANTIGEN (PSA): Primary | ICD-10-CM

## 2020-03-16 LAB
BILIRUB BLD-MCNC: NEGATIVE MG/DL
CLARITY, POC: CLEAR
COLOR UR: YELLOW
GLUCOSE UR STRIP-MCNC: NEGATIVE MG/DL
KETONES UR QL: NEGATIVE
LEUKOCYTE EST, POC: ABNORMAL
NITRITE UR-MCNC: NEGATIVE MG/ML
PH UR: 7 [PH] (ref 5–8)
PROT UR STRIP-MCNC: NEGATIVE MG/DL
RBC # UR STRIP: NEGATIVE /UL
SP GR UR: 1.02 (ref 1–1.03)
UROBILINOGEN UR QL: NORMAL

## 2020-03-16 PROCEDURE — 99214 OFFICE O/P EST MOD 30 MIN: CPT | Performed by: UROLOGY

## 2020-03-16 PROCEDURE — 81003 URINALYSIS AUTO W/O SCOPE: CPT | Performed by: UROLOGY

## 2020-03-16 RX ORDER — LEVOFLOXACIN 500 MG/1
TABLET, FILM COATED ORAL
Qty: 1 TABLET | Refills: 0 | Status: SHIPPED | OUTPATIENT
Start: 2020-03-16 | End: 2020-07-07

## 2020-03-16 RX ORDER — MAGNESIUM HYDROXIDE 1200 MG/15ML
LIQUID ORAL
Qty: 1 ENEMA | Refills: 0 | Status: SHIPPED | OUTPATIENT
Start: 2020-03-16 | End: 2020-07-07

## 2020-03-27 ENCOUNTER — APPOINTMENT (OUTPATIENT)
Dept: PREADMISSION TESTING | Facility: HOSPITAL | Age: 80
End: 2020-03-27

## 2020-03-30 RX ORDER — CEFUROXIME AXETIL 500 MG/1
500 TABLET ORAL 2 TIMES DAILY
COMMUNITY
End: 2020-07-07

## 2020-04-03 ENCOUNTER — ANESTHESIA EVENT (OUTPATIENT)
Dept: PERIOP | Facility: HOSPITAL | Age: 80
End: 2020-04-03

## 2020-04-03 ENCOUNTER — HOSPITAL ENCOUNTER (OUTPATIENT)
Facility: HOSPITAL | Age: 80
Setting detail: HOSPITAL OUTPATIENT SURGERY
Discharge: HOME | End: 2020-04-03
Attending: UROLOGY | Admitting: UROLOGY

## 2020-04-03 ENCOUNTER — ANESTHESIA (OUTPATIENT)
Dept: PERIOP | Facility: HOSPITAL | Age: 80
End: 2020-04-03

## 2020-04-03 VITALS
DIASTOLIC BLOOD PRESSURE: 76 MMHG | HEART RATE: 76 BPM | SYSTOLIC BLOOD PRESSURE: 121 MMHG | WEIGHT: 164.68 LBS | BODY MASS INDEX: 24.96 KG/M2 | TEMPERATURE: 97.5 F | OXYGEN SATURATION: 98 % | HEIGHT: 68 IN | RESPIRATION RATE: 16 BRPM

## 2020-04-03 DIAGNOSIS — R97.20 ELEVATED PROSTATE SPECIFIC ANTIGEN (PSA): ICD-10-CM

## 2020-04-03 LAB
ANION GAP SERPL CALCULATED.3IONS-SCNC: 11 MMOL/L (ref 5–15)
BUN BLD-MCNC: 21 MG/DL (ref 8–23)
BUN/CREAT SERPL: 20.6 (ref 7–25)
CALCIUM SPEC-SCNC: 8.4 MG/DL (ref 8.6–10.5)
CHLORIDE SERPL-SCNC: 102 MMOL/L (ref 98–107)
CO2 SERPL-SCNC: 26 MMOL/L (ref 22–29)
CREAT BLD-MCNC: 1.02 MG/DL (ref 0.76–1.27)
DEPRECATED RDW RBC AUTO: 44.2 FL (ref 37–54)
ERYTHROCYTE [DISTWIDTH] IN BLOOD BY AUTOMATED COUNT: 12.7 % (ref 12.3–15.4)
GFR SERPL CREATININE-BSD FRML MDRD: 71 ML/MIN/1.73
GLUCOSE BLD-MCNC: 154 MG/DL (ref 65–99)
HCT VFR BLD AUTO: 39.1 % (ref 37.5–51)
HGB BLD-MCNC: 13.2 G/DL (ref 13–17.7)
MCH RBC QN AUTO: 32.4 PG (ref 26.6–33)
MCHC RBC AUTO-ENTMCNC: 33.8 G/DL (ref 31.5–35.7)
MCV RBC AUTO: 95.8 FL (ref 79–97)
PLATELET # BLD AUTO: 413 10*3/MM3 (ref 140–450)
PMV BLD AUTO: 9.8 FL (ref 6–12)
POTASSIUM BLD-SCNC: 4.2 MMOL/L (ref 3.5–5.2)
RBC # BLD AUTO: 4.08 10*6/MM3 (ref 4.14–5.8)
SODIUM BLD-SCNC: 139 MMOL/L (ref 136–145)
WBC NRBC COR # BLD: 7.56 10*3/MM3 (ref 3.4–10.8)

## 2020-04-03 PROCEDURE — 80048 BASIC METABOLIC PNL TOTAL CA: CPT | Performed by: UROLOGY

## 2020-04-03 PROCEDURE — 25010000002 FENTANYL CITRATE (PF) 100 MCG/2ML SOLUTION: Performed by: NURSE ANESTHETIST, CERTIFIED REGISTERED

## 2020-04-03 PROCEDURE — 25010000002 PROPOFOL 10 MG/ML EMULSION: Performed by: NURSE ANESTHETIST, CERTIFIED REGISTERED

## 2020-04-03 PROCEDURE — G0416 PROSTATE BIOPSY, ANY MTHD: HCPCS | Performed by: UROLOGY

## 2020-04-03 PROCEDURE — 25010000002 DEXAMETHASONE PER 1 MG: Performed by: ANESTHESIOLOGY

## 2020-04-03 PROCEDURE — 76942 ECHO GUIDE FOR BIOPSY: CPT | Performed by: UROLOGY

## 2020-04-03 PROCEDURE — 88342 IMHCHEM/IMCYTCHM 1ST ANTB: CPT | Performed by: UROLOGY

## 2020-04-03 PROCEDURE — 93005 ELECTROCARDIOGRAM TRACING: CPT | Performed by: UROLOGY

## 2020-04-03 PROCEDURE — 25010000002 GENTAMICIN PER 80 MG: Performed by: UROLOGY

## 2020-04-03 PROCEDURE — 85027 COMPLETE CBC AUTOMATED: CPT | Performed by: UROLOGY

## 2020-04-03 PROCEDURE — 55700 PR PROSTATE NEEDLE BIOPSY ANY APPROACH: CPT | Performed by: UROLOGY

## 2020-04-03 PROCEDURE — 93010 ELECTROCARDIOGRAM REPORT: CPT | Performed by: INTERNAL MEDICINE

## 2020-04-03 RX ORDER — SODIUM CHLORIDE, SODIUM LACTATE, POTASSIUM CHLORIDE, CALCIUM CHLORIDE 600; 310; 30; 20 MG/100ML; MG/100ML; MG/100ML; MG/100ML
100 INJECTION, SOLUTION INTRAVENOUS CONTINUOUS
Status: DISCONTINUED | OUTPATIENT
Start: 2020-04-03 | End: 2020-04-03 | Stop reason: HOSPADM

## 2020-04-03 RX ORDER — LIDOCAINE HYDROCHLORIDE 10 MG/ML
0.5 INJECTION, SOLUTION EPIDURAL; INFILTRATION; INTRACAUDAL; PERINEURAL ONCE AS NEEDED
Status: DISCONTINUED | OUTPATIENT
Start: 2020-04-03 | End: 2020-04-03 | Stop reason: SDUPTHER

## 2020-04-03 RX ORDER — LIDOCAINE HYDROCHLORIDE 20 MG/ML
INJECTION, SOLUTION INFILTRATION; PERINEURAL AS NEEDED
Status: DISCONTINUED | OUTPATIENT
Start: 2020-04-03 | End: 2020-04-03 | Stop reason: SURG

## 2020-04-03 RX ORDER — NALOXONE HCL 0.4 MG/ML
0.4 VIAL (ML) INJECTION AS NEEDED
Status: DISCONTINUED | OUTPATIENT
Start: 2020-04-03 | End: 2020-04-03 | Stop reason: HOSPADM

## 2020-04-03 RX ORDER — FENTANYL CITRATE 50 UG/ML
25 INJECTION, SOLUTION INTRAMUSCULAR; INTRAVENOUS
Status: DISCONTINUED | OUTPATIENT
Start: 2020-04-03 | End: 2020-04-03 | Stop reason: HOSPADM

## 2020-04-03 RX ORDER — LABETALOL HYDROCHLORIDE 5 MG/ML
5 INJECTION, SOLUTION INTRAVENOUS
Status: DISCONTINUED | OUTPATIENT
Start: 2020-04-03 | End: 2020-04-03 | Stop reason: HOSPADM

## 2020-04-03 RX ORDER — EPHEDRINE SULFATE 50 MG/ML
INJECTION INTRAVENOUS AS NEEDED
Status: DISCONTINUED | OUTPATIENT
Start: 2020-04-03 | End: 2020-04-03 | Stop reason: SURG

## 2020-04-03 RX ORDER — ONDANSETRON 2 MG/ML
4 INJECTION INTRAMUSCULAR; INTRAVENOUS ONCE AS NEEDED
Status: DISCONTINUED | OUTPATIENT
Start: 2020-04-03 | End: 2020-04-03 | Stop reason: HOSPADM

## 2020-04-03 RX ORDER — LIDOCAINE HYDROCHLORIDE 10 MG/ML
0.5 INJECTION, SOLUTION EPIDURAL; INFILTRATION; INTRACAUDAL; PERINEURAL ONCE AS NEEDED
Status: DISCONTINUED | OUTPATIENT
Start: 2020-04-03 | End: 2020-04-03 | Stop reason: HOSPADM

## 2020-04-03 RX ORDER — OXYCODONE AND ACETAMINOPHEN 7.5; 325 MG/1; MG/1
2 TABLET ORAL EVERY 4 HOURS PRN
Status: DISCONTINUED | OUTPATIENT
Start: 2020-04-03 | End: 2020-04-03 | Stop reason: HOSPADM

## 2020-04-03 RX ORDER — SODIUM CHLORIDE, SODIUM LACTATE, POTASSIUM CHLORIDE, CALCIUM CHLORIDE 600; 310; 30; 20 MG/100ML; MG/100ML; MG/100ML; MG/100ML
1000 INJECTION, SOLUTION INTRAVENOUS CONTINUOUS
Status: DISCONTINUED | OUTPATIENT
Start: 2020-04-03 | End: 2020-04-03 | Stop reason: HOSPADM

## 2020-04-03 RX ORDER — SODIUM CHLORIDE 0.9 % (FLUSH) 0.9 %
3 SYRINGE (ML) INJECTION EVERY 12 HOURS SCHEDULED
Status: DISCONTINUED | OUTPATIENT
Start: 2020-04-03 | End: 2020-04-03 | Stop reason: HOSPADM

## 2020-04-03 RX ORDER — SODIUM CHLORIDE 0.9 % (FLUSH) 0.9 %
3-10 SYRINGE (ML) INJECTION AS NEEDED
Status: DISCONTINUED | OUTPATIENT
Start: 2020-04-03 | End: 2020-04-03 | Stop reason: HOSPADM

## 2020-04-03 RX ORDER — LIDOCAINE HYDROCHLORIDE 40 MG/ML
SOLUTION TOPICAL AS NEEDED
Status: DISCONTINUED | OUTPATIENT
Start: 2020-04-03 | End: 2020-04-03 | Stop reason: SURG

## 2020-04-03 RX ORDER — ACETAMINOPHEN 500 MG
1000 TABLET ORAL ONCE
Status: COMPLETED | OUTPATIENT
Start: 2020-04-03 | End: 2020-04-03

## 2020-04-03 RX ORDER — OXYCODONE AND ACETAMINOPHEN 10; 325 MG/1; MG/1
1 TABLET ORAL ONCE AS NEEDED
Status: COMPLETED | OUTPATIENT
Start: 2020-04-03 | End: 2020-04-03

## 2020-04-03 RX ORDER — PROPOFOL 10 MG/ML
VIAL (ML) INTRAVENOUS AS NEEDED
Status: DISCONTINUED | OUTPATIENT
Start: 2020-04-03 | End: 2020-04-03 | Stop reason: SURG

## 2020-04-03 RX ORDER — FLUMAZENIL 0.1 MG/ML
0.2 INJECTION INTRAVENOUS AS NEEDED
Status: DISCONTINUED | OUTPATIENT
Start: 2020-04-03 | End: 2020-04-03 | Stop reason: HOSPADM

## 2020-04-03 RX ORDER — PHENYLEPHRINE HCL IN 0.9% NACL 0.8MG/10ML
SYRINGE (ML) INTRAVENOUS AS NEEDED
Status: DISCONTINUED | OUTPATIENT
Start: 2020-04-03 | End: 2020-04-03 | Stop reason: SURG

## 2020-04-03 RX ORDER — FENTANYL CITRATE 50 UG/ML
INJECTION, SOLUTION INTRAMUSCULAR; INTRAVENOUS AS NEEDED
Status: DISCONTINUED | OUTPATIENT
Start: 2020-04-03 | End: 2020-04-03 | Stop reason: SURG

## 2020-04-03 RX ORDER — DEXAMETHASONE SODIUM PHOSPHATE 4 MG/ML
4 INJECTION, SOLUTION INTRA-ARTICULAR; INTRALESIONAL; INTRAMUSCULAR; INTRAVENOUS; SOFT TISSUE ONCE AS NEEDED
Status: COMPLETED | OUTPATIENT
Start: 2020-04-03 | End: 2020-04-03

## 2020-04-03 RX ORDER — SODIUM CHLORIDE 0.9 % (FLUSH) 0.9 %
3 SYRINGE (ML) INJECTION AS NEEDED
Status: DISCONTINUED | OUTPATIENT
Start: 2020-04-03 | End: 2020-04-03 | Stop reason: HOSPADM

## 2020-04-03 RX ADMIN — OXYCODONE HYDROCHLORIDE AND ACETAMINOPHEN 1 TABLET: 10; 325 TABLET ORAL at 09:52

## 2020-04-03 RX ADMIN — SODIUM CHLORIDE, POTASSIUM CHLORIDE, SODIUM LACTATE AND CALCIUM CHLORIDE 1000 ML: 600; 310; 30; 20 INJECTION, SOLUTION INTRAVENOUS at 06:23

## 2020-04-03 RX ADMIN — LIDOCAINE HYDROCHLORIDE 1 EACH: 40 SOLUTION TOPICAL at 08:19

## 2020-04-03 RX ADMIN — LIDOCAINE HYDROCHLORIDE 80 MG: 20 INJECTION, SOLUTION INFILTRATION; PERINEURAL at 08:19

## 2020-04-03 RX ADMIN — FENTANYL CITRATE 25 MCG: 50 INJECTION, SOLUTION INTRAMUSCULAR; INTRAVENOUS at 08:40

## 2020-04-03 RX ADMIN — Medication 240 MCG: at 08:27

## 2020-04-03 RX ADMIN — DEXAMETHASONE SODIUM PHOSPHATE 4 MG: 4 INJECTION, SOLUTION INTRAMUSCULAR; INTRAVENOUS at 07:14

## 2020-04-03 RX ADMIN — Medication 240 MCG: at 08:23

## 2020-04-03 RX ADMIN — ACETAMINOPHEN 1000 MG: 500 TABLET, FILM COATED ORAL at 07:14

## 2020-04-03 RX ADMIN — PROPOFOL 150 MG: 10 INJECTION, EMULSION INTRAVENOUS at 08:19

## 2020-04-03 RX ADMIN — FENTANYL CITRATE 75 MCG: 50 INJECTION, SOLUTION INTRAMUSCULAR; INTRAVENOUS at 08:19

## 2020-04-03 RX ADMIN — GENTAMICIN SULFATE 460 MG: 40 INJECTION, SOLUTION INTRAMUSCULAR; INTRAVENOUS at 06:53

## 2020-04-03 RX ADMIN — EPHEDRINE SULFATE 20 MG: 50 INJECTION INTRAVENOUS at 08:29

## 2020-04-03 NOTE — ANESTHESIA PREPROCEDURE EVALUATION
Anesthesia Evaluation     Patient summary reviewed   history of anesthetic complications: PONV  NPO Solid Status: > 8 hours  NPO Liquid Status: > 8 hours           Airway   Mallampati: III  TM distance: >3 FB  Neck ROM: full  Dental    (+) upper dentures and partials        Pulmonary - normal exam    breath sounds clear to auscultation  (-) asthma, recent URI, sleep apnea, not a smoker  Cardiovascular - normal exam  Exercise tolerance: good (4-7 METS)    ECG reviewed  Rhythm: regular  Rate: normal    (+) hypertension, hyperlipidemia,   (-) pacemaker, past MI, angina, cardiac stents, CABG      Neuro/Psych  (-) seizures, TIA, CVA  GI/Hepatic/Renal/Endo    (+)   thyroid problem hypothyroidism  (-) GERD, liver disease, no renal disease, diabetes    Musculoskeletal     Abdominal    Substance History      OB/GYN          Other                        Anesthesia Plan    ASA 2     general     intravenous induction     Anesthetic plan, all risks, benefits, and alternatives have been provided, discussed and informed consent has been obtained with: patient.

## 2020-04-03 NOTE — ANESTHESIA PROCEDURE NOTES
Airway  Urgency: elective    Date/Time: 4/3/2020 8:19 AM  Airway not difficult    General Information and Staff    Patient location during procedure: OR  CRNA: Balbir Barger CRNA    Indications and Patient Condition  Indications for airway management: airway protection    Preoxygenated: yes  Mask difficulty assessment: 1 - vent by mask    Final Airway Details  Final airway type: endotracheal airway      Successful airway: ETT  Cuffed: yes   Successful intubation technique: video laryngoscopy  Endotracheal tube insertion site: oral  Blade: Allan  Blade size: 2  ETT size (mm): 7.5  Cormack-Lehane Classification: grade IIa - partial view of glottis  Placement verified by: chest auscultation and capnometry   Measured from: lips  Number of attempts at approach: 1  Assessment: lips, teeth, and gum same as pre-op and atraumatic intubation    Additional Comments  Atraumatic intubation

## 2020-04-03 NOTE — ANESTHESIA POSTPROCEDURE EVALUATION
"Patient: Maximo Granados    Procedure Summary     Date:  04/03/20 Room / Location:  [facility] OR  /  [facility] OR    Anesthesia Start:  0811 Anesthesia Stop:  0932    Procedure:  PROSTATE ULTRASOUND BIOPSY MRI FUSION WITH URONAV (N/A ) Diagnosis:       Elevated prostate specific antigen (PSA)      (Elevated prostate specific antigen (PSA) [R97.20])    Surgeon:  Robert Álvarez MD Provider:  Balbir Barger CRNA    Anesthesia Type:  general ASA Status:  2          Anesthesia Type: general    Vitals  Vitals Value Taken Time   /75 4/3/2020 10:05 AM   Temp 97.5 °F (36.4 °C) 4/3/2020 10:00 AM   Pulse 66 4/3/2020 10:06 AM   Resp 14 4/3/2020 10:00 AM   SpO2 100 % 4/3/2020 10:06 AM   Vitals shown include unvalidated device data.        Post Anesthesia Care and Evaluation    Patient location during evaluation: PACU  Patient participation: complete - patient participated  Level of consciousness: awake and alert  Pain management: adequate  Airway patency: patent  Anesthetic complications: No anesthetic complications    Cardiovascular status: acceptable  Respiratory status: acceptable  Hydration status: acceptable    Comments: Blood pressure 121/72, pulse 67, temperature 97.5 °F (36.4 °C), temperature source Temporal, resp. rate 14, height 173 cm (68.11\"), weight 74.7 kg (164 lb 10.9 oz), SpO2 100 %.    Pt discharged from PACU based on abdulaziz score >8      "

## 2020-04-03 NOTE — DISCHARGE INSTRUCTIONS

## 2020-04-03 NOTE — OP NOTE
"Operative Summary    Maximo Granados  Date of Procedure: 4/3/2020    Pre-op Diagnosis:   Elevated prostate specific antigen (PSA) [R97.20]    Post-op Diagnosis:     Post-Op Diagnosis Codes:     * Elevated prostate specific antigen (PSA) [R97.20]    Procedure/CPT® Codes:      Procedure(s):  PROSTATE ULTRASOUND BIOPSY MRI FUSION WITH URONAV    Surgeon(s):  Robert Álvarez MD    Anesthesia: General    Staff:   Circulator: Nayeli Aly RN  Scrub Person: Julieta Bolivar  Assistant: Akanksha Fink    Indications for procedure:  79-year-old male with an elevated PSA of 11.9 along with a PI-RADS 5 lesion in the right anterior mid transition zone with history of negative prostate biopsy in 2016 presenting for MRI ultrasound fusion biopsy.    Findings:   Height (mm): 26.0mm  Width (mm): 41.6mm  Length (mm): 40.2mm  Volume (cc):  22.7cc  PSA density:  0.45        Procedure details:  Patient is taken the operating room were he is given general anesthesia.  IV antibiotics were administered.  He is then placed in left lateral decubitus position.  A final timeout was performed.  Previous MRI images are reviewed as they have been loaded into the UroNav system. The electromagnetic generator for probe detection is attached to the bed and positioned appropriately. Using the B&K ultrasound, the transrectal probe is inserted to identify the mid portion of the prostate gland in the transverse image.  Measurements of the width and height of the gland are then obtained.  The multiplanar probe is then used to take sagittal images of the prostate and again measurement is taken of the length of the gland.  The prostate volume is calculated using height times with times length ×1/2 which is then compared to the volume of the MRI.    The pre-procedural MRI (along with the segmentation and targeting data as determined by the radiologist) selected for fusion biopsy is then overlaid \"fused\" to a the 3D TRUS volume of the " "prostate constructed from a series of 2D TRUS images obtained by a \"sweep\" of the TRUS probe.  Both rigid and elastic registration is carried out using the UroNav software.    The region of interest is identified in the Rightmid of the prostate.  The target is identified as drawn by the radiologist using the UroNav software that included the bulls eye with the  transverse setting of the multiplanar probe. 4 biopsies are taken from the region of interest and labeled as \"region of interest #1 \"on the pathology requisition.  The midportion was biopsied with the other biopsies being a few millimeters away toward the periphery of the lesion by adjusting the probe.      After biopsies of  the suspicious lesion(s) was completed, ultrasound-guided \"random \"biopsies were performed again using ultrasound guidance with the saggital setting of the multiplanar probe.  I used a standard 12 core template and labeled the biopsy with respect to location.  Specimens were then placed in formalin.    The systematic of both the random biopsies and those of the region of interest(s) were reviewed and felt to be appropriate sampling of each.  The probe was removed.    Estimated Blood Loss: Minimal    Specimens:                Specimens     ID Source Type Tests Collected By Collected At Frozen?      A Prostate Tissue · TISSUE PATHOLOGY EXAM   Robert Álvarez MD 4/3/20 0731      Description: Right Mid    B Prostate Tissue · TISSUE PATHOLOGY EXAM   Robert Álvarez MD 4/3/20 0731      Description: Right Base    C Prostate Tissue · TISSUE PATHOLOGY EXAM   Robert Álvarez MD 4/3/20 0731      Description: Right [place]    D Prostate Tissue · TISSUE PATHOLOGY EXAM   Robert Álvarez MD 4/3/20 0731      Description: Left Mid    E Prostate Tissue · TISSUE PATHOLOGY EXAM   Robert Álvarez MD 4/3/20 0731      Description: Left Base    F Prostate Tissue · TISSUE PATHOLOGY EXAM   Robert Álvarez MD 4/3/20 0731      Description: Left [place] "    G Prostate Tissue · TISSUE PATHOLOGY EXAM   Robert Álvarez MD 4/3/20 0731      Description: Lesion 1 target 1-5            Drains: * No LDAs found *    Complications: none    Plan: I will call the patient with and his pathology results are available.  He was instructed to go to the emergency room for any fevers.    Robert Álvarez MD     Date: 4/3/2020  Time: 09:24

## 2020-04-07 ENCOUNTER — TELEMEDICINE (OUTPATIENT)
Dept: UROLOGY | Facility: CLINIC | Age: 80
End: 2020-04-07

## 2020-04-07 DIAGNOSIS — C61 MALIGNANT NEOPLASM OF PROSTATE (HCC): Primary | ICD-10-CM

## 2020-04-07 PROCEDURE — 99442 PR PHYS/QHP TELEPHONE EVALUATION 11-20 MIN: CPT | Performed by: UROLOGY

## 2020-04-07 NOTE — PROGRESS NOTES
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  I had a long discussion with the patient and his spouse regarding the natural history of prostate cancer along with my recommendation for staging CT pelvis and bone scan given his elevated PSA in the 11-12 range.  They answered appropriate questions all of which were answered to their satisfaction.  I recommended he follow-up with me next month with pre-clinic CT and bone scan given the ongoing CO VID 19 issues.  He voiced understanding and agreement.    We spent a total of 15 minutes on the phone discussing the above.

## 2020-04-09 ENCOUNTER — TELEPHONE (OUTPATIENT)
Dept: UROLOGY | Facility: CLINIC | Age: 80
End: 2020-04-09

## 2020-05-04 ENCOUNTER — HOSPITAL ENCOUNTER (OUTPATIENT)
Dept: NUCLEAR MEDICINE | Facility: HOSPITAL | Age: 80
Discharge: HOME | End: 2020-05-04

## 2020-05-04 ENCOUNTER — HOSPITAL ENCOUNTER (OUTPATIENT)
Dept: CT IMAGING | Facility: HOSPITAL | Age: 80
Discharge: HOME | End: 2020-05-04
Admitting: UROLOGY

## 2020-05-04 DIAGNOSIS — C61 MALIGNANT NEOPLASM OF PROSTATE (HCC): ICD-10-CM

## 2020-05-04 LAB — CREAT BLDA-MCNC: 1.2 MG/DL (ref 0.6–1.3)

## 2020-05-04 PROCEDURE — 72193 CT PELVIS W/DYE: CPT

## 2020-05-04 PROCEDURE — 0 TECHNETIUM OXIDRONATE KIT: Performed by: UROLOGY

## 2020-05-04 PROCEDURE — 82565 ASSAY OF CREATININE: CPT

## 2020-05-04 PROCEDURE — 25010000002 IOPAMIDOL 61 % SOLUTION: Performed by: UROLOGY

## 2020-05-04 PROCEDURE — A9561 TC99M OXIDRONATE: HCPCS | Performed by: UROLOGY

## 2020-05-04 PROCEDURE — 78306 BONE IMAGING WHOLE BODY: CPT

## 2020-05-04 RX ADMIN — TECHNETIUM TC 99M OXIDRONATE 1 DOSE: 3.15 INJECTION, POWDER, LYOPHILIZED, FOR SOLUTION INTRAVENOUS at 09:52

## 2020-05-04 RX ADMIN — IOPAMIDOL 50 ML: 612 INJECTION, SOLUTION INTRAVENOUS at 10:42

## 2020-05-04 RX ADMIN — IOPAMIDOL 100 ML: 612 INJECTION, SOLUTION INTRAVENOUS at 10:42

## 2020-05-06 ENCOUNTER — OFFICE VISIT (OUTPATIENT)
Dept: UROLOGY | Facility: CLINIC | Age: 80
End: 2020-05-06

## 2020-05-06 VITALS — WEIGHT: 163.2 LBS | BODY MASS INDEX: 25.62 KG/M2 | HEIGHT: 67 IN | TEMPERATURE: 97.8 F

## 2020-05-06 DIAGNOSIS — C61 MALIGNANT NEOPLASM OF PROSTATE (HCC): Primary | ICD-10-CM

## 2020-05-06 DIAGNOSIS — N52.1 ERECTILE DYSFUNCTION DUE TO DISEASES CLASSIFIED ELSEWHERE: ICD-10-CM

## 2020-05-06 LAB
BILIRUB BLD-MCNC: NEGATIVE MG/DL
CLARITY, POC: CLEAR
COLOR UR: YELLOW
GLUCOSE UR STRIP-MCNC: NEGATIVE MG/DL
KETONES UR QL: NEGATIVE
LEUKOCYTE EST, POC: NEGATIVE
NITRITE UR-MCNC: NEGATIVE MG/ML
PH UR: 6.5 [PH] (ref 5–8)
PROT UR STRIP-MCNC: NEGATIVE MG/DL
RBC # UR STRIP: NEGATIVE /UL
SP GR UR: 1.01 (ref 1–1.03)
UROBILINOGEN UR QL: NORMAL

## 2020-05-06 PROCEDURE — 99215 OFFICE O/P EST HI 40 MIN: CPT | Performed by: UROLOGY

## 2020-05-06 PROCEDURE — 81003 URINALYSIS AUTO W/O SCOPE: CPT | Performed by: UROLOGY

## 2020-05-06 RX ORDER — SILDENAFIL 25 MG/1
25 TABLET, FILM COATED ORAL DAILY PRN
COMMUNITY
End: 2020-07-07

## 2020-05-06 RX ORDER — SILDENAFIL 100 MG/1
100 TABLET, FILM COATED ORAL DAILY PRN
Qty: 10 TABLET | Refills: 11 | Status: SHIPPED | OUTPATIENT
Start: 2020-05-06 | End: 2021-04-01

## 2020-05-07 ENCOUNTER — TELEPHONE (OUTPATIENT)
Dept: UROLOGY | Facility: CLINIC | Age: 80
End: 2020-05-07

## 2020-05-07 NOTE — TELEPHONE ENCOUNTER
I spoke to the patient's wife and gave her the information of [employer]. Patient will need to call [phone], option 2 for billing. They have questions about his insurance. They will need to talk to patient and he can give them permission to talk to his wife if need be. She voiced understanding.

## 2020-05-14 ENCOUNTER — TELEPHONE (OUTPATIENT)
Dept: UROLOGY | Facility: CLINIC | Age: 80
End: 2020-05-14

## 2020-05-14 NOTE — TELEPHONE ENCOUNTER
Pt called and asked about the form that he needed to fill out for the testing that  ordered (oncotype Dx testing). I looked and I could not find out anything on this form and I informed the patient that I would send it to Dr.Evans CAGLE to see if she knew anything about it and that they would be giving them a call back.

## 2020-05-18 ENCOUNTER — TELEPHONE (OUTPATIENT)
Dept: UROLOGY | Facility: CLINIC | Age: 80
End: 2020-05-18

## 2020-05-18 NOTE — TELEPHONE ENCOUNTER
Tried to call the pt to tell him I am having oncotype send him another form.  He didn't answer and wasn't able to leave a vm

## 2020-05-18 NOTE — TELEPHONE ENCOUNTER
----- Message from Sobeida Bolivar MA sent at 5/14/2020 11:31 AM CDT -----  Pt called and asked about a form to be filled out for his testing (Oncotype Dx testing). He said that they got medicare to pay for it all but 200$ and they are anxious to get it done but have no clue where the form is. He said that  ordered the testing but gave no form. I could not find out anything about the form so didn't know if you knew or not? [phone] is the pt phone number to call back.

## 2020-06-03 ENCOUNTER — OFFICE VISIT (OUTPATIENT)
Dept: UROLOGY | Facility: CLINIC | Age: 80
End: 2020-06-03

## 2020-06-03 VITALS — WEIGHT: 161.8 LBS | HEIGHT: 67 IN | TEMPERATURE: 97.3 F | BODY MASS INDEX: 25.39 KG/M2

## 2020-06-03 DIAGNOSIS — C61 MALIGNANT NEOPLASM OF PROSTATE (HCC): Primary | ICD-10-CM

## 2020-06-03 DIAGNOSIS — N52.9 IMPOTENCE DUE TO ERECTILE DYSFUNCTION: ICD-10-CM

## 2020-06-03 PROCEDURE — 99214 OFFICE O/P EST MOD 30 MIN: CPT | Performed by: UROLOGY
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
## 2020-06-04 ENCOUNTER — OFFICE VISIT (OUTPATIENT)
Dept: UROLOGY | Facility: CLINIC | Age: 80
End: 2020-06-04

## 2020-06-04 VITALS — BODY MASS INDEX: 25.21 KG/M2 | TEMPERATURE: 97.6 F | WEIGHT: 160.6 LBS | HEIGHT: 67 IN

## 2020-06-04 DIAGNOSIS — C61 PROSTATE CANCER (HCC): Primary | ICD-10-CM

## 2020-06-04 DIAGNOSIS — N52.9 IMPOTENCE DUE TO ERECTILE DYSFUNCTION: ICD-10-CM

## 2020-06-04 DIAGNOSIS — N40.1 BENIGN PROSTATIC HYPERPLASIA WITH URINARY OBSTRUCTION: ICD-10-CM

## 2020-06-04 DIAGNOSIS — N13.8 BENIGN PROSTATIC HYPERPLASIA WITH URINARY OBSTRUCTION: ICD-10-CM

## 2020-06-04 LAB
BILIRUB BLD-MCNC: NEGATIVE MG/DL
CLARITY, POC: CLEAR
COLOR UR: ABNORMAL
GLUCOSE UR STRIP-MCNC: NEGATIVE MG/DL
KETONES UR QL: NEGATIVE
LEUKOCYTE EST, POC: NEGATIVE
NITRITE UR-MCNC: NEGATIVE MG/ML
PH UR: 5.5 [PH] (ref 5–8)
PROT UR STRIP-MCNC: ABNORMAL MG/DL
RBC # UR STRIP: NEGATIVE /UL
SP GR UR: 1.02 (ref 1–1.03)
UROBILINOGEN UR QL: NORMAL

## 2020-06-04 PROCEDURE — 81003 URINALYSIS AUTO W/O SCOPE: CPT | Performed by: UROLOGY

## 2020-06-04 PROCEDURE — 99215 OFFICE O/P EST HI 40 MIN: CPT | Performed by: UROLOGY

## 2020-07-02 ENCOUNTER — TRANSCRIBE ORDERS (OUTPATIENT)
Dept: ADMINISTRATIVE | Facility: HOSPITAL | Age: 80
End: 2020-07-02

## 2020-07-02 DIAGNOSIS — Z01.818 PRE-OP TESTING: Primary | ICD-10-CM

## 2020-07-07 ENCOUNTER — APPOINTMENT (OUTPATIENT)
Dept: PREADMISSION TESTING | Facility: HOSPITAL | Age: 80
End: 2020-07-07

## 2020-07-07 VITALS
OXYGEN SATURATION: 98 % | SYSTOLIC BLOOD PRESSURE: 116 MMHG | HEART RATE: 65 BPM | WEIGHT: 164.02 LBS | DIASTOLIC BLOOD PRESSURE: 73 MMHG | BODY MASS INDEX: 24.86 KG/M2 | RESPIRATION RATE: 20 BRPM | HEIGHT: 68 IN

## 2020-07-07 LAB
ALBUMIN SERPL-MCNC: 4.3 G/DL (ref 3.5–5.2)
ALBUMIN/GLOB SERPL: 1.5 G/DL
ALP SERPL-CCNC: 84 U/L (ref 39–117)
ALT SERPL W P-5'-P-CCNC: 20 U/L (ref 1–41)
ANION GAP SERPL CALCULATED.3IONS-SCNC: 9 MMOL/L (ref 5–15)
AST SERPL-CCNC: 29 U/L (ref 1–40)
BASOPHILS # BLD AUTO: 0.04 10*3/MM3 (ref 0–0.2)
BASOPHILS NFR BLD AUTO: 0.5 % (ref 0–1.5)
BILIRUB SERPL-MCNC: 0.3 MG/DL (ref 0–1.2)
BUN SERPL-MCNC: 20 MG/DL (ref 8–23)
BUN/CREAT SERPL: 18.5 (ref 7–25)
CALCIUM SPEC-SCNC: 8.8 MG/DL (ref 8.6–10.5)
CHLORIDE SERPL-SCNC: 99 MMOL/L (ref 98–107)
CO2 SERPL-SCNC: 30 MMOL/L (ref 22–29)
CREAT SERPL-MCNC: 1.08 MG/DL (ref 0.76–1.27)
DEPRECATED RDW RBC AUTO: 43.6 FL (ref 37–54)
EOSINOPHIL # BLD AUTO: 0.19 10*3/MM3 (ref 0–0.4)
EOSINOPHIL NFR BLD AUTO: 2.5 % (ref 0.3–6.2)
ERYTHROCYTE [DISTWIDTH] IN BLOOD BY AUTOMATED COUNT: 12.3 % (ref 12.3–15.4)
GFR SERPL CREATININE-BSD FRML MDRD: 66 ML/MIN/1.73
GLOBULIN UR ELPH-MCNC: 2.9 GM/DL
GLUCOSE SERPL-MCNC: 96 MG/DL (ref 65–99)
HCT VFR BLD AUTO: 37.2 % (ref 37.5–51)
HGB BLD-MCNC: 12.6 G/DL (ref 13–17.7)
IMM GRANULOCYTES # BLD AUTO: 0.01 10*3/MM3 (ref 0–0.05)
IMM GRANULOCYTES NFR BLD AUTO: 0.1 % (ref 0–0.5)
LYMPHOCYTES # BLD AUTO: 2.62 10*3/MM3 (ref 0.7–3.1)
LYMPHOCYTES NFR BLD AUTO: 33.8 % (ref 19.6–45.3)
MCH RBC QN AUTO: 32.8 PG (ref 26.6–33)
MCHC RBC AUTO-ENTMCNC: 33.9 G/DL (ref 31.5–35.7)
MCV RBC AUTO: 96.9 FL (ref 79–97)
MONOCYTES # BLD AUTO: 0.75 10*3/MM3 (ref 0.1–0.9)
MONOCYTES NFR BLD AUTO: 9.7 % (ref 5–12)
NEUTROPHILS NFR BLD AUTO: 4.14 10*3/MM3 (ref 1.7–7)
NEUTROPHILS NFR BLD AUTO: 53.4 % (ref 42.7–76)
NRBC BLD AUTO-RTO: 0 /100 WBC (ref 0–0.2)
PLATELET # BLD AUTO: 420 10*3/MM3 (ref 140–450)
PMV BLD AUTO: 10.2 FL (ref 6–12)
POTASSIUM SERPL-SCNC: 4 MMOL/L (ref 3.5–5.2)
PROT SERPL-MCNC: 7.2 G/DL (ref 6–8.5)
RBC # BLD AUTO: 3.84 10*6/MM3 (ref 4.14–5.8)
SODIUM SERPL-SCNC: 138 MMOL/L (ref 136–145)
WBC # BLD AUTO: 7.75 10*3/MM3 (ref 3.4–10.8)

## 2020-07-07 PROCEDURE — 36415 COLL VENOUS BLD VENIPUNCTURE: CPT

## 2020-07-07 PROCEDURE — 85025 COMPLETE CBC W/AUTO DIFF WBC: CPT | Performed by: SPECIALIST

## 2020-07-07 PROCEDURE — 80053 COMPREHEN METABOLIC PANEL: CPT | Performed by: SPECIALIST

## 2020-07-07 NOTE — DISCHARGE INSTRUCTIONS
DAY OF SURGERY INSTRUCTIONS        YOUR SURGEON: dr jackson    PROCEDURE: ***wide excision skin lesion left forearm    DATE OF SURGERY: ***7/13/2020    ARRIVAL TIME: AS DIRECTED BY OFFICE    YOU MAY TAKE THE FOLLOWING MEDICATION(S) THE MORNING OF SURGERY WITH A SIP OF WATER: ***no medicines per anesthesia      ALL OTHER HOME MEDICATION CHECK WITH YOUR PHYSICIAN      DO NOT TAKE ANY ERECTILE DYSFUNCTION MEDICATIONS (EX: CIALIS, VIAGRA) 24 HOURS PRIOR TO SURGERY if you take these medications                      MANAGING PAIN AFTER SURGERY    We know you are probably wondering what your pain will be like after surgery.  Following surgery it is unrealistic to expect you will not have pain.   Pain is how our bodies let us know that something is wrong or cautions us to be careful.  That said, our goal is to make your pain tolerable.    Methods we may use to treat your pain include (oral or IV medications, PCAs, epidurals, nerve blocks, etc.)   While some procedures require IV pain medications for a short time after surgery, transitioning to pain medications by mouth allows for better management of pain.   Your nurse will encourage you to take oral pain medications whenever possible.  IV medications work almost immediately, but only last a short while.  Taking medications by mouth allows for a more constant level of medication in your blood stream for a longer period of time.      Once your pain is out of control it is harder to get back under control.  It is important you are aware when your next dose of pain medication is due.  If you are admitted, your nurse may write the time of your next dose on the white board in your room to help you remember.      We are interested in your pain and encourage you to inform us about aggravating factors during your visit.   Many times a simple repositioning every few hours can make a big difference.    If your physician says it is okay, do not let your pain prevent you from getting  out of bed. Be sure to call your nurse for assistance prior to getting up so you do not fall.      Before surgery, please decide your tolerable pain goal.  These faces help describe the pain ratings we use on a 0-10 scale.   Be prepared to tell us your goal and whether or not you take pain or anxiety medications at home.          BEFORE YOU COME TO THE HOSPITAL  (Pre-op instructions)  • Do not eat, drink, smoke or chew gum after midnight the night before surgery.  This also includes no mints.  • Morning of surgery take only the medicines you have been instructed with a sip of water unless otherwise instructed  by your physician.  • Do not shave, wear makeup or dark nail polish.  • Remove all jewelry including rings.  • Leave anything you consider valuable at home.  • Leave your suitcase in the car until after your surgery.  • Bring the following with you if applicable:  o Picture ID and insurance, Medicare or Medicaid cards  o Co-pay/deductible required by insurance (cash, check, credit card)  o Copy of advance directive, living will or power-of- documents if not brought to PAT  o CPAP or BIPAP mask and tubing  o Relaxation aids ( book, magazine), etc.  o Hearing aids                        ON THE DAY OF SURGERY  · On the day of surgery check in at registration located at the main entrance of the hospital.   ? You will be registered and given a beeper with instructions where to wait in the main lobby.  ? When your beeper lights up and vibrates a member of the Outpatient Surgery staff will meet you at the double doors under the stair steps and escort you to your preoperative room.   · You may have cloth compression devices placed on your legs. These help to prevent blood clots and reduce swelling in your legs.  · An IV may be inserted into one of your veins.  · In the operating room, you may be given one or more of the following:  ? A medicine to help you relax (sedative).  ? A medicine to numb the area  "(local anesthetic).  ? A medicine to make you fall asleep (general anesthetic).  ? A medicine that is injected into an area of your body to numb everything below the injection site (regional anesthetic).  · Your surgical site will be marked or identified.  · You may be given an antibiotic through your IV to help prevent infection.  Contact a health care provider if you:  · Develop a fever of more than 100.4°F (38°C) or other feelings of illness during the 48 hours before your surgery.  · Have symptoms that get worse.  Have questions or concerns about your surgery    General Anesthesia/Surgery, Adult  General anesthesia is the use of medicines to make a person \"go to sleep\" (unconscious) for a medical procedure. General anesthesia must be used for certain procedures, and is often recommended for procedures that:  · Last a long time.  · Require you to be still or in an unusual position.  · Are major and can cause blood loss.  The medicines used for general anesthesia are called general anesthetics. As well as making you unconscious for a certain amount of time, these medicines:  · Prevent pain.  · Control your blood pressure.  · Relax your muscles.  Tell a health care provider about:  · Any allergies you have.  · All medicines you are taking, including vitamins, herbs, eye drops, creams, and over-the-counter medicines.  · Any problems you or family members have had with anesthetic medicines.  · Types of anesthetics you have had in the past.  · Any blood disorders you have.  · Any surgeries you have had.  · Any medical conditions you have.  · Any recent upper respiratory, chest, or ear infections.  · Any history of:  ? Heart or lung conditions, such as heart failure, sleep apnea, asthma, or chronic obstructive pulmonary disease (COPD).  ?  service.  ? Depression or anxiety.  · Any tobacco or drug use, including marijuana or alcohol use.  · Whether you are pregnant or may be pregnant.  What are the " risks?  Generally, this is a safe procedure. However, problems may occur, including:  · Allergic reaction.  · Lung and heart problems.  · Inhaling food or liquid from the stomach into the lungs (aspiration).  · Nerve injury.  · Air in the bloodstream, which can lead to stroke.  · Extreme agitation or confusion (delirium) when you wake up from the anesthetic.  · Waking up during your procedure and being unable to move. This is rare.  These problems are more likely to develop if you are having a major surgery or if you have an advanced or serious medical condition. You can prevent some of these complications by answering all of your health care provider's questions thoroughly and by following all instructions before your procedure.  General anesthesia can cause side effects, including:  · Nausea or vomiting.  · A sore throat from the breathing tube.  · Hoarseness.  · Wheezing or coughing.  · Shaking chills.  · Tiredness.  · Body aches.  · Anxiety.  · Sleepiness or drowsiness.  · Confusion or agitation.  RISKS AND COMPLICATIONS OF SURGERY  Your health care provider will discuss possible risks and complications with you before surgery. Common risks and complications include:    · Problems due to the use of anesthetics.  · Blood loss and replacement (does not apply to minor surgical procedures).  · Temporary increase in pain due to surgery.  · Uncorrected pain or problems that the surgery was meant to correct.  · Infection.  · New damage.    What happens before the procedure?    Medicines  Ask your health care provider about:  · Changing or stopping your regular medicines. This is especially important if you are taking diabetes medicines or blood thinners.  · Taking medicines such as aspirin and ibuprofen. These medicines can thin your blood. Do not take these medicines unless your health care provider tells you to take them.  · Taking over-the-counter medicines, vitamins, herbs, and supplements. Do not take these during  the week before your procedure unless your health care provider approves them.  General instructions  · Starting 3-6 weeks before the procedure, do not use any products that contain nicotine or tobacco, such as cigarettes and e-cigarettes. If you need help quitting, ask your health care provider.  · If you brush your teeth on the morning of the procedure, make sure to spit out all of the toothpaste.  · Tell your health care provider if you become ill or develop a cold, cough, or fever.  · If instructed by your health care provider, bring your sleep apnea device with you on the day of your surgery (if applicable).  · Ask your health care provider if you will be going home the same day, the following day, or after a longer hospital stay.  ? Plan to have someone take you home from the hospital or clinic.  ? Plan to have a responsible adult care for you for at least 24 hours after you leave the hospital or clinic. This is important.  What happens during the procedure?  · You will be given anesthetics through both of the following:  ? A mask placed over your nose and mouth.  ? An IV in one of your veins.  · You may receive a medicine to help you relax (sedative).  · After you are unconscious, a breathing tube may be inserted down your throat to help you breathe. This will be removed before you wake up.  · An anesthesia specialist will stay with you throughout your procedure. He or she will:  ? Keep you comfortable and safe by continuing to give you medicines and adjusting the amount of medicine that you get.  ? Monitor your blood pressure, pulse, and oxygen levels to make sure that the anesthetics do not cause any problems.  The procedure may vary among health care providers and hospitals.  What happens after the procedure?  · Your blood pressure, temperature, heart rate, breathing rate, and blood oxygen level will be monitored until the medicines you were given have worn off.  · You will wake up in a recovery area. You  may wake up slowly.  · If you feel anxious or agitated, you may be given medicine to help you calm down.  · If you will be going home the same day, your health care provider may check to make sure you can walk, drink, and urinate.  · Your health care provider will treat any pain or side effects you have before you go home.  · Do not drive for 24 hours if you were given a sedative.  Summary  · General anesthesia is used to keep you still and prevent pain during a procedure.  · It is important to tell your healthcare provider about your medical history and any surgeries you have had, and previous experience with anesthesia.  · Follow your healthcare provider’s instructions about when to stop eating, drinking, or taking certain medicines before your procedure.  · Plan to have someone take you home from the hospital or clinic.  This information is not intended to replace advice given to you by your health care provider. Make sure you discuss any questions you have with your health care provider.  Document Released: 03/26/2009 Document Revised: 08/03/2018 Document Reviewed: 08/03/2018  [employer] Interactive Patient Education © 2019 [employer]       Fall Prevention in Hospitals, Adult  As a hospital patient, your condition and the treatments you receive can increase your risk for falls. Some additional risk factors for falls in a hospital include:  · Being in an unfamiliar environment.  · Being on bed rest.  · Your surgery.  · Taking certain medicines.  · Your tubing requirements, such as intravenous (IV) therapy or catheters.  It is important that you learn how to decrease fall risks while at the hospital. Below are important tips that can help prevent falls.  SAFETY TIPS FOR PREVENTING FALLS  Talk about your risk of falling.  · Ask your health care provider why you are at risk for falling. Is it your medicine, illness, tubing placement, or something else?  · Make a plan with your health care provider to keep you safe  from falls.  · Ask your health care provider or pharmacist about side effects of your medicines. Some medicines can make you dizzy or affect your coordination.  Ask for help.  · Ask for help before getting out of bed. You may need to press your call button.  · Ask for assistance in getting safely to the toilet.  · Ask for a walker or cane to be put at your bedside. Ask that most of the side rails on your bed be placed up before your health care provider leaves the room.  · Ask family or friends to sit with you.  · Ask for things that are out of your reach, such as your glasses, hearing aids, telephone, bedside table, or call button.  Follow these tips to avoid falling:  · Stay lying or seated, rather than standing, while waiting for help.  · Wear rubber-soled slippers or shoes whenever you walk in the hospital.  · Avoid quick, sudden movements.  ¨ Change positions slowly.  ¨ Sit on the side of your bed before standing.  ¨ Stand up slowly and wait before you start to walk.  · Let your health care provider know if there is a spill on the floor.  · Pay careful attention to the medical equipment, electrical cords, and tubes around you.  · When you need help, use your call button by your bed or in the bathroom. Wait for one of your health care providers to help you.  · If you feel dizzy or unsure of your footing, return to bed and wait for assistance.  · Avoid being distracted by the TV, telephone, or another person in your room.  · Do not lean or support yourself on rolling objects, such as IV poles or bedside tables.     This information is not intended to replace advice given to you by your health care provider. Make sure you discuss any questions you have with your health care provider.     Document Released: 12/15/2001 Document Revised: 01/08/2016 Document Reviewed: 08/25/2013  [employer] Interactive Patient Education ©2016 [employer]       [facility] 4% Patient Instruction Sheet    Chlorhexidine  Before Surgery  Chlorhexidine gluconate (CHG) is a germ-killing (antiseptic) solution that is used to clean the skin. It gets rid of the bacteria that normally live on the skin. Cleaning your skin with CHG before surgery helps lower the risk for infection after surgery.    How to use CHG solution  · You will take 2 showers, one shower the night before surgery, the second shower the morning of surgery before coming to the hospital.  · Use CHG only as told by your health care provider, and follow the instructions on the label.  · Use CHG solution while taking a shower. Follow these steps when using CHG solution (unless your health care provider gives you different instructions):  1. Start the shower.  2. Use your normal soap and shampoo to wash your face and hair.  3. Turn off the shower or move out of the shower stream.  4. Pour the CHG onto a clean washcloth. Do not use any type of brush or rough-edged sponge.  5. Starting at your neck, lather your body down to your toes. Make sure you:  6. Pay special attention to the part of your body where you will be having surgery. Scrub this area for at least 1 minute.  7. Use the full amount of CHG as directed. Usually, this is one half bottle for each shower.  8. Do not use CHG on your head or face. If the solution gets into your ears or eyes, rinse them well with water.  9. Avoid your genital area.  10. Avoid any areas of skin that have broken skin, cuts, or scrapes.  11. Scrub your back and under your arms. Make sure to wash skin folds.  12. Let the lather sit on your skin for 1-2 minutes or as long as told by your health care  provider.  13. Thoroughly rinse your entire body in the shower. Make sure that all body creases and crevices are rinsed well.  14. Dry off with a clean towel. Do not put any substances on your body afterward, such as powder, lotion, or perfume.  15. Put on clean clothes or pajamas.  16. If it is the night before your surgery, sleep in clean  sheets.    What are the risks?  Risks of using CHG include:  · A skin reaction.  · Hearing loss, if CHG gets in your ears.  · Eye injury, if CHG gets in your eyes and is not rinsed out.  · The CHG product catching fire.  Make sure that you avoid smoking and flames after applying CHG to your skin.  Do not use CHG:  · If you have a chlorhexidine allergy or have previously reacted to chlorhexidine.  · On babies younger than 2 months of age.      On the day of surgery, when you are taken to your room in Outpatient Surgery you will be given a CHG prepackaged cloth to wipe the site for your surgery.  How to use CHG prepackaged cloths  · Follow the instructions on the label.  · Use the CHG cloth on clean, dry skin. Follow these steps when using a CHG cloth (unless your health care provider gives you different instructions):  1. Using the CHG cloth, vigorously scrub the part of your body where you will be having surgery. Scrub using a back-and-forth motion for 3 minutes. The area on your body should be completely wet with CHG when you are finished scrubbing.  2. Do not rinse. Discard the cloth and let the area air-dry for 1 minute. Do not put any substances on your body afterward, such as powder, lotion, or perfume.  Contact a health care provider if:  · Your skin gets irritated after scrubbing.  · You have questions about using your solution or cloth.  Get help right away if:  · Your eyes become very red or swollen.  · Your eyes itch badly.  · Your skin itches badly and is red or swollen.  · Your hearing changes.  · You have trouble seeing.  · You have swelling or tingling in your mouth or throat.  · You have trouble breathing.  · You swallow any chlorhexidine.  Summary  · Chlorhexidine gluconate (CHG) is a germ-killing (antiseptic) solution that is used to clean the skin. Cleaning your skin with CHG before surgery helps lower the risk for infection after surgery.  · You may be given CHG to use at home. It may be in a  bottle or in a prepackaged cloth to use on your skin. Carefully follow your health care provider's instructions and the instructions on the product label.  · Do not use CHG if you have a chlorhexidine allergy.  · Contact your health care provider if your skin gets irritated after scrubbing.  This information is not intended to replace advice given to you by your health care provider. Make sure you discuss any questions you have with your health care provider.  Document Released: 09/11/2013 Document Revised: 11/15/2018 Document Reviewed: 11/15/2018  [employer] Interactive Patient Education © 2019 [employer]          PATIENT/FAMILY/RESPONSIBLE PARTY VERBALIZES UNDERSTANDING OF ABOVE EDUCATION.  COPY OF PAIN SCALE GIVEN AND REVIEWED WITH VERBALIZED UNDERSTANDING.

## 2020-07-10 ENCOUNTER — LAB (OUTPATIENT)
Dept: LAB | Facility: HOSPITAL | Age: 80
End: 2020-07-10

## 2020-07-10 PROCEDURE — C9803 HOPD COVID-19 SPEC COLLECT: HCPCS | Performed by: SPECIALIST

## 2020-07-10 PROCEDURE — U0003 INFECTIOUS AGENT DETECTION BY NUCLEIC ACID (DNA OR RNA); SEVERE ACUTE RESPIRATORY SYNDROME CORONAVIRUS 2 (SARS-COV-2) (CORONAVIRUS DISEASE [COVID-19]), AMPLIFIED PROBE TECHNIQUE, MAKING USE OF HIGH THROUGHPUT TECHNOLOGIES AS DESCRIBED BY CMS-2020-01-R: HCPCS | Performed by: SPECIALIST

## 2020-07-11 LAB
COVID LABCORP PRIORITY: NORMAL
SARS-COV-2 RNA RESP QL NAA+PROBE: NOT DETECTED

## 2020-07-13 ENCOUNTER — ANESTHESIA (OUTPATIENT)
Dept: PERIOP | Facility: HOSPITAL | Age: 80
End: 2020-07-13

## 2020-07-13 ENCOUNTER — HOSPITAL ENCOUNTER (OUTPATIENT)
Facility: HOSPITAL | Age: 80
Setting detail: HOSPITAL OUTPATIENT SURGERY
Discharge: HOME | End: 2020-07-13
Attending: SPECIALIST | Admitting: SPECIALIST

## 2020-07-13 ENCOUNTER — ANESTHESIA EVENT (OUTPATIENT)
Dept: PERIOP | Facility: HOSPITAL | Age: 80
End: 2020-07-13

## 2020-07-13 VITALS
SYSTOLIC BLOOD PRESSURE: 113 MMHG | OXYGEN SATURATION: 96 % | TEMPERATURE: 96.7 F | RESPIRATION RATE: 16 BRPM | HEART RATE: 73 BPM | DIASTOLIC BLOOD PRESSURE: 74 MMHG

## 2020-07-13 DIAGNOSIS — D48.9 NEOPLASM OF UNCERTAIN BEHAVIOR: ICD-10-CM

## 2020-07-13 PROCEDURE — 25010000002 ONDANSETRON PER 1 MG: Performed by: NURSE ANESTHETIST, CERTIFIED REGISTERED

## 2020-07-13 PROCEDURE — 88342 IMHCHEM/IMCYTCHM 1ST ANTB: CPT | Performed by: SPECIALIST

## 2020-07-13 PROCEDURE — 88341 IMHCHEM/IMCYTCHM EA ADD ANTB: CPT

## 2020-07-13 PROCEDURE — 25010000002 DEXAMETHASONE PER 1 MG: Performed by: ANESTHESIOLOGY

## 2020-07-13 PROCEDURE — 88323 CONSLTJ&REPRT MATRL PREP SLD: CPT

## 2020-07-13 PROCEDURE — 25010000002 PROPOFOL 10 MG/ML EMULSION: Performed by: NURSE ANESTHETIST, CERTIFIED REGISTERED

## 2020-07-13 PROCEDURE — 25010000002 PHENYLEPHRINE HCL 0.8 MG/10ML SOLUTION PREFILLED SYRINGE: Performed by: NURSE ANESTHETIST, CERTIFIED REGISTERED

## 2020-07-13 PROCEDURE — 88321 CONSLTJ&REPRT SLD PREP ELSWR: CPT | Performed by: SPECIALIST

## 2020-07-13 PROCEDURE — 25010000002 DEXAMETHASONE PER 1 MG: Performed by: NURSE ANESTHETIST, CERTIFIED REGISTERED

## 2020-07-13 PROCEDURE — 88342 IMHCHEM/IMCYTCHM 1ST ANTB: CPT

## 2020-07-13 PROCEDURE — 88341 IMHCHEM/IMCYTCHM EA ADD ANTB: CPT | Performed by: SPECIALIST

## 2020-07-13 PROCEDURE — 25010000002 FENTANYL CITRATE (PF) 100 MCG/2ML SOLUTION: Performed by: NURSE ANESTHETIST, CERTIFIED REGISTERED

## 2020-07-13 PROCEDURE — 88305 TISSUE EXAM BY PATHOLOGIST: CPT | Performed by: SPECIALIST

## 2020-07-13 RX ORDER — FENTANYL CITRATE 50 UG/ML
INJECTION, SOLUTION INTRAMUSCULAR; INTRAVENOUS AS NEEDED
Status: DISCONTINUED | OUTPATIENT
Start: 2020-07-13 | End: 2020-07-13 | Stop reason: SURG

## 2020-07-13 RX ORDER — FLUMAZENIL 0.1 MG/ML
0.2 INJECTION INTRAVENOUS AS NEEDED
Status: DISCONTINUED | OUTPATIENT
Start: 2020-07-13 | End: 2020-07-13 | Stop reason: HOSPADM

## 2020-07-13 RX ORDER — LIDOCAINE HYDROCHLORIDE 10 MG/ML
0.5 INJECTION, SOLUTION EPIDURAL; INFILTRATION; INTRACAUDAL; PERINEURAL ONCE AS NEEDED
Status: DISCONTINUED | OUTPATIENT
Start: 2020-07-13 | End: 2020-07-13 | Stop reason: HOSPADM

## 2020-07-13 RX ORDER — NALOXONE HCL 0.4 MG/ML
0.4 VIAL (ML) INJECTION AS NEEDED
Status: DISCONTINUED | OUTPATIENT
Start: 2020-07-13 | End: 2020-07-13 | Stop reason: HOSPADM

## 2020-07-13 RX ORDER — SODIUM CHLORIDE, SODIUM LACTATE, POTASSIUM CHLORIDE, CALCIUM CHLORIDE 600; 310; 30; 20 MG/100ML; MG/100ML; MG/100ML; MG/100ML
1000 INJECTION, SOLUTION INTRAVENOUS CONTINUOUS
Status: DISCONTINUED | OUTPATIENT
Start: 2020-07-13 | End: 2020-07-13 | Stop reason: HOSPADM

## 2020-07-13 RX ORDER — ONDANSETRON 2 MG/ML
4 INJECTION INTRAMUSCULAR; INTRAVENOUS ONCE AS NEEDED
Status: DISCONTINUED | OUTPATIENT
Start: 2020-07-13 | End: 2020-07-13 | Stop reason: HOSPADM

## 2020-07-13 RX ORDER — PROPOFOL 10 MG/ML
VIAL (ML) INTRAVENOUS AS NEEDED
Status: DISCONTINUED | OUTPATIENT
Start: 2020-07-13 | End: 2020-07-13 | Stop reason: SURG

## 2020-07-13 RX ORDER — PHENYLEPHRINE HCL IN 0.9% NACL 0.8MG/10ML
SYRINGE (ML) INTRAVENOUS AS NEEDED
Status: DISCONTINUED | OUTPATIENT
Start: 2020-07-13 | End: 2020-07-13 | Stop reason: SURG

## 2020-07-13 RX ORDER — SODIUM CHLORIDE 0.9 % (FLUSH) 0.9 %
3-10 SYRINGE (ML) INJECTION AS NEEDED
Status: DISCONTINUED | OUTPATIENT
Start: 2020-07-13 | End: 2020-07-13 | Stop reason: HOSPADM

## 2020-07-13 RX ORDER — ACETAMINOPHEN 500 MG
1000 TABLET ORAL ONCE
Status: COMPLETED | OUTPATIENT
Start: 2020-07-13 | End: 2020-07-13

## 2020-07-13 RX ORDER — LABETALOL HYDROCHLORIDE 5 MG/ML
5 INJECTION, SOLUTION INTRAVENOUS
Status: DISCONTINUED | OUTPATIENT
Start: 2020-07-13 | End: 2020-07-13 | Stop reason: HOSPADM

## 2020-07-13 RX ORDER — SODIUM CHLORIDE, SODIUM LACTATE, POTASSIUM CHLORIDE, CALCIUM CHLORIDE 600; 310; 30; 20 MG/100ML; MG/100ML; MG/100ML; MG/100ML
100 INJECTION, SOLUTION INTRAVENOUS CONTINUOUS
Status: DISCONTINUED | OUTPATIENT
Start: 2020-07-13 | End: 2020-07-13 | Stop reason: HOSPADM

## 2020-07-13 RX ORDER — ONDANSETRON 2 MG/ML
INJECTION INTRAMUSCULAR; INTRAVENOUS AS NEEDED
Status: DISCONTINUED | OUTPATIENT
Start: 2020-07-13 | End: 2020-07-13 | Stop reason: SURG

## 2020-07-13 RX ORDER — DEXAMETHASONE SODIUM PHOSPHATE 4 MG/ML
4 INJECTION, SOLUTION INTRA-ARTICULAR; INTRALESIONAL; INTRAMUSCULAR; INTRAVENOUS; SOFT TISSUE ONCE AS NEEDED
Status: COMPLETED | OUTPATIENT
Start: 2020-07-13 | End: 2020-07-13

## 2020-07-13 RX ORDER — OXYCODONE AND ACETAMINOPHEN 7.5; 325 MG/1; MG/1
2 TABLET ORAL EVERY 4 HOURS PRN
Status: DISCONTINUED | OUTPATIENT
Start: 2020-07-13 | End: 2020-07-13 | Stop reason: HOSPADM

## 2020-07-13 RX ORDER — HYDROCODONE BITARTRATE AND ACETAMINOPHEN 7.5; 325 MG/1; MG/1
1 TABLET ORAL EVERY 4 HOURS PRN
Qty: 30 TABLET | Refills: 0 | Status: SHIPPED | OUTPATIENT
Start: 2020-07-13 | End: 2021-03-17

## 2020-07-13 RX ORDER — EPHEDRINE SULFATE 50 MG/ML
INJECTION INTRAVENOUS AS NEEDED
Status: DISCONTINUED | OUTPATIENT
Start: 2020-07-13 | End: 2020-07-13 | Stop reason: SURG

## 2020-07-13 RX ORDER — SODIUM CHLORIDE 0.9 % (FLUSH) 0.9 %
3 SYRINGE (ML) INJECTION EVERY 12 HOURS SCHEDULED
Status: DISCONTINUED | OUTPATIENT
Start: 2020-07-13 | End: 2020-07-13 | Stop reason: HOSPADM

## 2020-07-13 RX ORDER — MAGNESIUM HYDROXIDE 1200 MG/15ML
LIQUID ORAL AS NEEDED
Status: DISCONTINUED | OUTPATIENT
Start: 2020-07-13 | End: 2020-07-13 | Stop reason: HOSPADM

## 2020-07-13 RX ORDER — DEXAMETHASONE SODIUM PHOSPHATE 4 MG/ML
INJECTION, SOLUTION INTRA-ARTICULAR; INTRALESIONAL; INTRAMUSCULAR; INTRAVENOUS; SOFT TISSUE AS NEEDED
Status: DISCONTINUED | OUTPATIENT
Start: 2020-07-13 | End: 2020-07-13 | Stop reason: SURG

## 2020-07-13 RX ORDER — SODIUM CHLORIDE 0.9 % (FLUSH) 0.9 %
3 SYRINGE (ML) INJECTION AS NEEDED
Status: DISCONTINUED | OUTPATIENT
Start: 2020-07-13 | End: 2020-07-13 | Stop reason: HOSPADM

## 2020-07-13 RX ORDER — OXYCODONE AND ACETAMINOPHEN 10; 325 MG/1; MG/1
1 TABLET ORAL ONCE AS NEEDED
Status: COMPLETED | OUTPATIENT
Start: 2020-07-13 | End: 2020-07-13

## 2020-07-13 RX ORDER — FENTANYL CITRATE 50 UG/ML
25 INJECTION, SOLUTION INTRAMUSCULAR; INTRAVENOUS
Status: DISCONTINUED | OUTPATIENT
Start: 2020-07-13 | End: 2020-07-13 | Stop reason: HOSPADM

## 2020-07-13 RX ADMIN — Medication 80 MCG: at 08:15

## 2020-07-13 RX ADMIN — Medication 80 MCG: at 08:17

## 2020-07-13 RX ADMIN — EPHEDRINE SULFATE 15 MG: 50 INJECTION INTRAVENOUS at 08:29

## 2020-07-13 RX ADMIN — DEXAMETHASONE SODIUM PHOSPHATE 4 MG: 4 INJECTION, SOLUTION INTRAMUSCULAR; INTRAVENOUS at 08:21

## 2020-07-13 RX ADMIN — FENTANYL CITRATE 50 MCG: 50 INJECTION, SOLUTION INTRAMUSCULAR; INTRAVENOUS at 08:14

## 2020-07-13 RX ADMIN — OXYCODONE HYDROCHLORIDE AND ACETAMINOPHEN 1 TABLET: 10; 325 TABLET ORAL at 09:35

## 2020-07-13 RX ADMIN — ONDANSETRON HYDROCHLORIDE 4 MG: 2 SOLUTION INTRAMUSCULAR; INTRAVENOUS at 08:23

## 2020-07-13 RX ADMIN — SODIUM CHLORIDE, POTASSIUM CHLORIDE, SODIUM LACTATE AND CALCIUM CHLORIDE 1000 ML: 600; 310; 30; 20 INJECTION, SOLUTION INTRAVENOUS at 07:15

## 2020-07-13 RX ADMIN — VASOPRESSIN 0.5 ML: 20 INJECTION INTRAVENOUS at 08:23

## 2020-07-13 RX ADMIN — FENTANYL CITRATE 50 MCG: 50 INJECTION, SOLUTION INTRAMUSCULAR; INTRAVENOUS at 08:33

## 2020-07-13 RX ADMIN — DEXAMETHASONE SODIUM PHOSPHATE 4 MG: 4 INJECTION, SOLUTION INTRAMUSCULAR; INTRAVENOUS at 07:40

## 2020-07-13 RX ADMIN — Medication 80 MCG: at 08:19

## 2020-07-13 RX ADMIN — CEFAZOLIN 1 G: 1 INJECTION, POWDER, FOR SOLUTION INTRAMUSCULAR; INTRAVENOUS; PARENTERAL at 08:16

## 2020-07-13 RX ADMIN — EPHEDRINE SULFATE 15 MG: 50 INJECTION INTRAVENOUS at 08:27

## 2020-07-13 RX ADMIN — ACETAMINOPHEN 1000 MG: 500 TABLET, FILM COATED ORAL at 07:40

## 2020-07-13 RX ADMIN — GLYCOPYRROLATE 0.2 MG: 0.2 INJECTION, SOLUTION INTRAMUSCULAR; INTRAVENOUS at 08:25

## 2020-07-13 RX ADMIN — PROPOFOL 50 MG: 10 INJECTION, EMULSION INTRAVENOUS at 08:12

## 2020-07-13 RX ADMIN — LIDOCAINE HYDROCHLORIDE 100 MG: 20 INJECTION, SOLUTION INTRAVENOUS at 08:11

## 2020-07-13 RX ADMIN — Medication 160 MCG: at 08:47

## 2020-07-13 RX ADMIN — PROPOFOL 100 MG: 10 INJECTION, EMULSION INTRAVENOUS at 08:11

## 2020-07-13 NOTE — ANESTHESIA PREPROCEDURE EVALUATION
Anesthesia Evaluation     Patient summary reviewed   history of anesthetic complications: PONV  NPO Solid Status: > 8 hours  NPO Liquid Status: > 8 hours           Airway   Mallampati: II  TM distance: >3 FB  Neck ROM: full  Dental    (+) upper dentures and partials        Pulmonary    (-) asthma, recent URI, sleep apnea, not a smoker  Cardiovascular   Exercise tolerance: good (4-7 METS)    ECG reviewed    (+) hypertension, hyperlipidemia,   (-) pacemaker, past MI, angina, cardiac stents, CABG      Neuro/Psych  (-) seizures, TIA, CVA  GI/Hepatic/Renal/Endo    (+)   thyroid problem hypothyroidism  (-) GERD, liver disease, no renal disease, diabetes    Musculoskeletal     Abdominal    Substance History      OB/GYN          Other      history of cancer (prostate, skin)                      Anesthesia Plan    ASA 2     general     intravenous induction     Anesthetic plan, all risks, benefits, and alternatives have been provided, discussed and informed consent has been obtained with: patient.

## 2020-07-13 NOTE — ANESTHESIA POSTPROCEDURE EVALUATION
Patient: Maximo Granados    Procedure Summary     Date:  07/13/20 Room / Location:  [facility] OR  /  [facility] OR    Anesthesia Start:  0807 Anesthesia Stop:  0902    Procedure:  WIDE EXCISION SKIN LESION LEFT FOREARM (Left Arm Upper) Diagnosis:  (SKIN LESION LEFT FOREARM)    Surgeon:  Yoli Joshi MD Provider:  Yumiko Mixon CRNA    Anesthesia Type:  general ASA Status:  2          Anesthesia Type: general    Vitals  Vitals Value Taken Time   /63 7/13/2020  9:45 AM   Temp 96.7 °F (35.9 °C) 7/13/2020  9:45 AM   Pulse 75 7/13/2020  9:47 AM   Resp 14 7/13/2020  9:45 AM   SpO2 95 % 7/13/2020  9:47 AM   Vitals shown include unvalidated device data.        Post Anesthesia Care and Evaluation    Patient location during evaluation: PACU  Patient participation: complete - patient participated  Level of consciousness: awake and alert  Pain management: adequate  Airway patency: patent  Anesthetic complications: No anesthetic complications  PONV Status: none  Cardiovascular status: acceptable and hemodynamically stable  Respiratory status: acceptable  Hydration status: acceptable    Comments: Blood pressure 115/72, pulse 69, temperature 96.7 °F (35.9 °C), temperature source Temporal, resp. rate 14, SpO2 98 %.    Patient discharged from PACU based upon Alexandre score. Please see RN notes for further details

## 2020-07-13 NOTE — ANESTHESIA PROCEDURE NOTES
Airway  Urgency: elective    Date/Time: 7/13/2020 8:11 AM  Airway not difficult    General Information and Staff    Patient location during procedure: OR  CRNA: Yumiko Mixon CRNA    Indications and Patient Condition  Indications for airway management: airway protection    Preoxygenated: yes  Mask difficulty assessment: 0 - not attempted    Final Airway Details  Final airway type: supraglottic airway      Successful airway: I-gel  Size 4    Number of attempts at approach: 1  Assessment: lips, teeth, and gum same as pre-op and atraumatic intubation

## 2020-07-13 NOTE — OP NOTE
Yoli Joshi MD Operative Note    Maximo Granados  7/13/2020    Pre-op Diagnosis:   SKIN LESION LEFT FOREARM    Post-op Diagnosis:     same    Procedure/CPT® Codes:      Procedure(s):  WIDE EXCISION SKIN LESIONS LEFT FOREARM with layered closure    Surgeon(s):  Yoli Joshi MD    Anesthesia: General    Staff:   Circulator: Mega Paulino RN  Scrub Person: Brandy Fox; Jackie Soto    Estimated Blood Loss: minimal    Specimens:                Specimens     ID Source Type Tests Collected By Collected At Frozen?      A Forearm, Left Skin · TISSUE PATHOLOGY EXAM   Yoli Joshi MD 7/13/20 0820 No     Description: Forearm Left    This specimen was not marked as sent.            Drains: * No LDAs found *    Indications: Neoplasm uncertain behavior left forearm with secondary lesion noted just below it    Findings: 12 x 3 cm ellipse    Complications: none    Procedure: The patient was brought to the operating room and placed in the supine position.  After induction of general anesthesia and infusion of IV antibiotics, the patient was prepped and draped in the usual sterile fashion.  1 cm margins were marked and elliptical incision was made.  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  The ellipse had to be altered a little bit inferiorly to include the secondary lesion that had not been previously biopsied.  Specimen was amputated from the subtendinous tissue with cautery.  Specimen passed off.  Flaps were raised in all directions and closed with interrupted 3-0 Vicryl and staples on the skin.  Dressing was placed patient was awakened and transferred to the recovery room in stable condition of tolerated the procedure well.  At the end of the procedure all counts were correct.    Yoli Joshi MD     Date: 7/13/2020  Time: 09:04

## 2020-07-13 NOTE — DISCHARGE INSTRUCTIONS

## 2020-08-04 LAB
CYTO UR: NORMAL
LAB AP CASE REPORT: NORMAL
PATH REPORT.FINAL DX SPEC: NORMAL
PATH REPORT.GROSS SPEC: NORMAL

## 2021-03-17 ENCOUNTER — PROCEDURE VISIT (OUTPATIENT)
Dept: OTOLARYNGOLOGY | Facility: CLINIC | Age: 81
End: 2021-03-17

## 2021-03-17 ENCOUNTER — OFFICE VISIT (OUTPATIENT)
Dept: OTOLARYNGOLOGY | Facility: CLINIC | Age: 81
End: 2021-03-17

## 2021-03-17 VITALS
HEART RATE: 115 BPM | DIASTOLIC BLOOD PRESSURE: 75 MMHG | BODY MASS INDEX: 25.09 KG/M2 | TEMPERATURE: 97.7 F | SYSTOLIC BLOOD PRESSURE: 132 MMHG | WEIGHT: 164.6 LBS

## 2021-03-17 DIAGNOSIS — H93.19 TINNITUS, UNSPECIFIED LATERALITY: ICD-10-CM

## 2021-03-17 DIAGNOSIS — H61.23 BILATERAL IMPACTED CERUMEN: ICD-10-CM

## 2021-03-17 DIAGNOSIS — H90.3 SENSORINEURAL HEARING LOSS (SNHL) OF BOTH EARS: Primary | ICD-10-CM

## 2021-03-17 PROCEDURE — 69210 REMOVE IMPACTED EAR WAX UNI: CPT | Performed by: EMERGENCY MEDICINE

## 2021-03-17 PROCEDURE — 99203 OFFICE O/P NEW LOW 30 MIN: CPT | Performed by: EMERGENCY MEDICINE

## 2021-03-17 NOTE — PATIENT INSTRUCTIONS
Call for ear problems, especially change of hearing, ear pain or dizziness.  Use hearing protection in loud noise situations.  Consider hearing aid amplification.  Obtain a yearly audiogram to follow hearing.      CONTACT INFORMATION:  The main office phone number is [phone]. For emergencies after hours and on weekends, this number will convert over to our answering service and the on call provider will answer. Please try to keep non emergent phone calls/ questions to office hours 9am-5pm Monday through Friday.     [employer]  As an alternative, you can sign up and use the Epic MyChart system for more direct and quicker access for non emergent questions/ problems.  [facility] [employer] allows you to send messages to your doctor, view your test results, renew your prescriptions, schedule appointments, and more. To sign up, go to [employer] and click on the Sign Up Now link in the New User? box. Enter your [employer] Activation Code exactly as it appears below along with the last four digits of your Social Security Number and your Date of Birth () to complete the sign-up process. If you do not sign up before the expiration date, you must request a new code.    [employer] Activation Code: Activation code not generated  Current [employer] Status: Active    If you have questions, you can email [email or web address] or call [phone] to talk to our [employer] staff. Remember, [employer] is NOT to be used for urgent needs. For medical emergencies, dial 911.    EAR CARE: :using oil  Use a hair dryer on low heat and blow into the ear canals 2 times daily to keep ears dry.  DO Not use Q tips or Angelica pins, ever!!  Do not use alcohol in the ear canal (this will cause dryness and itching)  NO peroxide or alcohol in ears  Oil: Use Sweet oil, Olive oil, or Mineral oil, purchased over the counter, 2 to 3 times a week, Do not use Q tips, You may use a hair dryer on low heat. Blow in ears for 10-15 seconds 2  times daily to dry ear canals or if ear canals are itching.    Use plain white vinegar, once a week

## 2021-03-17 NOTE — PROGRESS NOTES
GIBSON Torres     Chief Complaint   Patient presents with   • Tinnitus        HPI   Maximo Granados is a  80 y.o. male who presents with complaints of ringing in his ears for approximately the last year. He has a long standing history of hearing loss and wears hearing aids.  He stopped wearing his hearing aids when covid began due to masks. He denies any otalgia, states he does have difficulty with wax accumulation.        Past History:   Past Medical History:   Diagnosis Date   • Arthritis    • BPH (benign prostatic hypertrophy)    • Elevated PSA    • Hyperlipemia    • Hypertension    • Hypothyroidism    • Impotence of organic origin    • Pernicious anemia    • PONV (postoperative nausea and vomiting)    • Prostate CA (CMS/HCC)      Past Surgical History:   Procedure Laterality Date   • ARM LESION/CYST EXCISION Left 7/13/2020    Procedure: WIDE EXCISION SKIN LESION LEFT FOREARM;  Surgeon: Yoli Joshi MD;  Location: [facility] OR;  Service: General;  Laterality: Left;   • CATARACT EXTRACTION     • COLONOSCOPY     • HERNIA REPAIR     • PROSTATE BIOPSY N/A 4/3/2020    Procedure: PROSTATE ULTRASOUND BIOPSY MRI FUSION WITH URONAV;  Surgeon: Robert Álvarez MD;  Location: [facility] OR;  Service: Urology;  Laterality: N/A;   • SPLENECTOMY  2000     Family History   Problem Relation Age of Onset   • No Known Problems Father    • No Known Problems Mother    • Prostate cancer Neg Hx      Social History     Tobacco Use   • Smoking status: Never Smoker   • Smokeless tobacco: Never Used   Substance Use Topics   • Alcohol use: No   • Drug use: Never     Outpatient Medications Marked as Taking for the 3/17/21 encounter (Office Visit) with Nicole Celeste APRN   Medication Sig Dispense Refill   • aspirin 81 MG EC tablet Take 81 mg by mouth Daily.     • Calcium Citrate-Vitamin D (CALCIUM + D PO) Take 1 tablet by mouth 2 (Two) Times a Week.     • Cholecalciferol (VITAMIN D3) 1000 UNITS capsule Take 2,000 Units by mouth  Daily.     • Cyanocobalamin (VITAMIN B-12 IJ) Inject  as directed Every 30 (Thirty) Days.     • fosinopril (MONOPRIL) 40 MG tablet Take 40 mg by mouth Daily.     • hydrochlorothiazide (HYDRODIURIL) 25 MG tablet Take 25 mg by mouth Daily.     • LEVOTHYROXINE SODIUM PO Take 75 mcg by mouth Daily.     • lovastatin (MEVACOR) 20 MG tablet Take 20 mg by mouth Every Night.     • metFORMIN (GLUCOPHAGE) 500 MG tablet Take 500 mg by mouth 2 (Two) Times a Day With Meals.     • Multiple Vitamins-Minerals (OCUVITE PO) Take 1 tablet by mouth Daily.     • sildenafil (VIAGRA) 100 MG tablet Take 1 tablet by mouth Daily As Needed for Erectile Dysfunction. 10 tablet 11   • [DISCONTINUED] HYDROcodone-acetaminophen (NORCO) 7.5-325 MG per tablet Take 1 tablet by mouth Every 4 (Four) Hours As Needed for Moderate Pain  (Pain). 30 tablet 0     Allergies:  Patient has no known allergies.        Vital Signs:   Temp:  [97.7 °F (36.5 °C)] 97.7 °F (36.5 °C)  Heart Rate:  [115] 115  BP: (132)/(75) 132/75      Physical Exam  HENT:      Head: Normocephalic.      Right Ear: Tympanic membrane and external ear normal. Decreased hearing noted. There is impacted cerumen (removed chairside with curette).      Left Ear: External ear normal. Decreased hearing noted. There is impacted cerumen.   Neurological:      Mental Status: He is alert.        Ear Microscopy with Left Cerumen Removal    Date/Time: 3/17/2021 2:25 PM  Performed by: Nicole Celeste APRN  Authorized by: Nicole Celeste APRN     Left auricle:   normal:   Left ear canal:   impacted cerumen, floor of canal there is a hardened scab, this was not manipulated, patient advised to use mineral oil 2-3 drops once weekly to soften area..     Procedure:    Cerumen was removed from the left ear with a curette.   Post-procedure details:     Inspection after the procedure revealed an intact TM.    No medication was applied to the ear canal.    The procedure was tolerated well, no immediate  complications.        RESULTS REVIEW:    I have reviewed the patients old records in the chart.  The following results/records were reviewed:   Procedure visit with Autumn Eid AUD (03/17/2021) SNHL type A tymp bilaterally                Assessment:    1. Sensorineural hearing loss (SNHL) of both ears    2. Bilateral impacted cerumen               Plan:  {Review (Popup)  Instructions  Charge Capture  LOS  Follow-up  Communications :23}      Call for ear problems, especially change of hearing, ear pain or dizziness.  Use hearing protection in loud noise situations.  Consider hearing aid amplification.  Obtain a yearly audiogram to follow hearing.        Orders Placed This Encounter   Procedures   • Ear Microscopy with Left Cerumen Removal       Return in about 3 months (around 6/17/2021) for Follow up with GIBSON Manzo.      GIBSON Torres  03/17/21  14:27 CDT

## 2021-03-24 ENCOUNTER — TELEPHONE (OUTPATIENT)
Dept: UROLOGY | Facility: CLINIC | Age: 81
End: 2021-03-24

## 2021-04-01 ENCOUNTER — OFFICE VISIT (OUTPATIENT)
Dept: UROLOGY | Facility: CLINIC | Age: 81
End: 2021-04-01

## 2021-04-01 VITALS — HEIGHT: 66 IN | BODY MASS INDEX: 26.52 KG/M2 | TEMPERATURE: 97.3 F | WEIGHT: 165 LBS

## 2021-04-01 DIAGNOSIS — C61 PROSTATE CANCER (HCC): Primary | ICD-10-CM

## 2021-04-01 DIAGNOSIS — R39.9 LOWER URINARY TRACT SYMPTOMS (LUTS): ICD-10-CM

## 2021-04-01 DIAGNOSIS — N52.9 IMPOTENCE OF ORGANIC ORIGIN: ICD-10-CM

## 2021-04-01 LAB
BILIRUB BLD-MCNC: NEGATIVE MG/DL
CLARITY, POC: CLEAR
COLOR UR: YELLOW
GLUCOSE UR STRIP-MCNC: NEGATIVE MG/DL
KETONES UR QL: NEGATIVE
LEUKOCYTE EST, POC: NEGATIVE
NITRITE UR-MCNC: NEGATIVE MG/ML
PH UR: 7 [PH] (ref 5–8)
PROT UR STRIP-MCNC: NEGATIVE MG/DL
RBC # UR STRIP: ABNORMAL /UL
SP GR UR: 1.01 (ref 1–1.03)
UROBILINOGEN UR QL: NORMAL

## 2021-04-01 PROCEDURE — 99214 OFFICE O/P EST MOD 30 MIN: CPT | Performed by: UROLOGY

## 2021-04-01 PROCEDURE — 81003 URINALYSIS AUTO W/O SCOPE: CPT | Performed by: UROLOGY

## 2021-04-01 RX ORDER — TADALAFIL 20 MG/1
20 TABLET ORAL DAILY PRN
Qty: 10 TABLET | Refills: 11 | Status: SHIPPED | OUTPATIENT
Start: 2021-04-01

## 2021-06-22 ENCOUNTER — OFFICE VISIT (OUTPATIENT)
Dept: OTOLARYNGOLOGY | Facility: CLINIC | Age: 81
End: 2021-06-22

## 2021-06-22 VITALS — HEART RATE: 78 BPM | DIASTOLIC BLOOD PRESSURE: 80 MMHG | SYSTOLIC BLOOD PRESSURE: 118 MMHG | TEMPERATURE: 97.8 F

## 2021-06-22 DIAGNOSIS — H93.19 TINNITUS, UNSPECIFIED LATERALITY: ICD-10-CM

## 2021-06-22 DIAGNOSIS — H90.3 SENSORINEURAL HEARING LOSS (SNHL) OF BOTH EARS: Primary | ICD-10-CM

## 2021-06-22 PROCEDURE — 99212 OFFICE O/P EST SF 10 MIN: CPT | Performed by: EMERGENCY MEDICINE

## 2021-06-22 NOTE — PROGRESS NOTES
GIBSON Torres     Chief Complaint   Patient presents with   • Ear Problem          HPI  Maximo Granados is a  80 y.o. male who is here for follow up. He reports no new complaints.           Vital Signs:   Temp:  [97.8 °F (36.6 °C)] 97.8 °F (36.6 °C)  Heart Rate:  [78] 78  BP: (118)/(80) 118/80    Physical Exam  Constitutional:       Appearance: He is normal weight.   HENT:      Head: Normocephalic.      Right Ear: Tympanic membrane, ear canal and external ear normal. Decreased hearing noted.      Left Ear: Tympanic membrane, ear canal and external ear normal. Decreased hearing noted.      Ears:      Comments: Left minimal non obstructing cerumen.  Dried scabbed area in floor of canal has resolved with oil care.  Neurological:      Mental Status: He is alert.               Procedure        Assessment/Plan    Assessment:   1. Sensorineural hearing loss (SNHL) of both ears    2. Tinnitus, unspecified laterality        Plan:        Continue current plan.  Call for ear problems, especially change of hearing, ear pain or dizziness.  Use hearing protection in loud noise situations.  Continue hearing aid use.   Continue ear care with 1-2 drops mineral oil in each ear canal once weekly.             Return in about 6 months (around 12/22/2021) for Follow up with GIBSON Meyer for ear cleaning.         GIBSON Torres  06/22/21  13:04 CDT

## 2021-08-15 PROCEDURE — 87635 SARS-COV-2 COVID-19 AMP PRB: CPT | Performed by: NURSE PRACTITIONER

## 2021-10-01 NOTE — PROGRESS NOTES
Subjective    Mr. Granados is 81 y.o. male    Chief Complaint: Prostate Cancer.     History of Present Illness  He is here today to discuss his  prostate cancer.  His biopsy was done 6/2020. This was done in the context of Elevated PSA. Severity best described as adenocarcinoma in 1/7 cores with the maximum tracy grade of 3+4.  Symptoms include  obstructive and irritative voiding symptoms with an AUA score 14/35, including incomplete emptying, frequency and urgency, nocturia.  The patients potency status can be defined as Impotence responsive to PDE 5 inhibitors.  Status post SBRT completed November 2020 at [facility].  PSA 1.27.    Lab Results   Component Value Date    PSA 1.270 10/04/2021    PSA 11.900 (H) 02/18/2020    PSA 12.500 (H) 01/28/2020         The following portions of the patient's history were reviewed and updated as appropriate: allergies, current medications, past family history, past medical history, past social history, past surgical history and problem list.    Review of Systems   Constitutional: Negative for chills and fever.   Gastrointestinal: Negative for abdominal pain, anal bleeding and blood in stool.   Genitourinary: Positive for frequency and urgency. Negative for dysuria and hematuria.         Current Outpatient Medications:   •  aspirin 81 MG EC tablet, Take 81 mg by mouth Daily., Disp: , Rfl:   •  Calcium Citrate-Vitamin D (CALCIUM + D PO), Take 1 tablet by mouth 2 (Two) Times a Week., Disp: , Rfl:   •  Cholecalciferol (VITAMIN D3) 1000 UNITS capsule, Take 2,000 Units by mouth Daily., Disp: , Rfl:   •  Cyanocobalamin (VITAMIN B-12 IJ), Inject  as directed Every 30 (Thirty) Days., Disp: , Rfl:   •  fosinopril (MONOPRIL) 40 MG tablet, Take 40 mg by mouth Daily., Disp: , Rfl:   •  hydrochlorothiazide (HYDRODIURIL) 25 MG tablet, Take 25 mg by mouth Daily., Disp: , Rfl:   •  LEVOTHYROXINE SODIUM PO, Take 75 mcg by mouth Daily., Disp: , Rfl:   •  lovastatin  "(MEVACOR) 20 MG tablet, Take 20 mg by mouth Every Night., Disp: , Rfl:   •  metFORMIN (GLUCOPHAGE) 500 MG tablet, Take 500 mg by mouth 2 (Two) Times a Day With Meals., Disp: , Rfl:   •  Multiple Vitamins-Minerals (OCUVITE PO), Take 1 tablet by mouth Daily., Disp: , Rfl:   •  tadalafil (CIALIS) 20 MG tablet, Take 1 tablet by mouth Daily As Needed for Erectile Dysfunction., Disp: 10 tablet, Rfl: 11    Past Medical History:   Diagnosis Date   • Arthritis    • BPH (benign prostatic hypertrophy)    • Diabetes mellitus (HCC)    • Elevated PSA    • Hyperlipemia    • Hypertension    • Hypothyroidism    • Impotence of organic origin    • Pernicious anemia    • PONV (postoperative nausea and vomiting)    • Prostate CA (HCC)        Past Surgical History:   Procedure Laterality Date   • ARM LESION/CYST EXCISION Left 7/13/2020    Procedure: WIDE EXCISION SKIN LESION LEFT FOREARM;  Surgeon: Yoli Joshi MD;  Location:  [facility] OR;  Service: General;  Laterality: Left;   • CATARACT EXTRACTION     • COLONOSCOPY     • HERNIA REPAIR     • PROSTATE BIOPSY N/A 4/3/2020    Procedure: PROSTATE ULTRASOUND BIOPSY MRI FUSION WITH URONAV;  Surgeon: Robert Álvarez MD;  Location:  [facility] OR;  Service: Urology;  Laterality: N/A;   • SPLENECTOMY  2000       Social History     Socioeconomic History   • Marital status:    Tobacco Use   • Smoking status: Never Smoker   • Smokeless tobacco: Never Used   Vaping Use   • Vaping Use: Never used   Substance and Sexual Activity   • Alcohol use: No   • Drug use: Never   • Sexual activity: Defer       Family History   Problem Relation Age of Onset   • No Known Problems Father    • No Known Problems Mother    • Prostate cancer Neg Hx        Objective    Temp 97.7 °F (36.5 °C) (Temporal)   Ht 167.6 cm (66\")   Wt 73.6 kg (162 lb 3.2 oz)   BMI 26.18 kg/m²     Physical Exam        Results for orders placed or performed in visit on 10/11/21   POC Urinalysis Dipstick, Multipro    Specimen: Urine "   Result Value Ref Range    Color Yellow Yellow, Straw, Dark Yellow, [place]    Clarity, UA Clear Clear    Glucose, UA Negative Negative, 1000 mg/dL (3+) mg/dL    Bilirubin Negative Negative    Ketones, UA Negative Negative    Specific Gravity  1.020 1.005 - 1.030    Blood, UA Negative Negative    pH, Urine 6.0 5.0 - 8.0    Protein, POC Negative Negative mg/dL    Urobilinogen, UA 1 E.U./dL  (A) Normal    Nitrite, UA Negative Negative    Leukocytes Negative Negative     Assessment and Plan    Diagnoses and all orders for this visit:    1. Prostate cancer (HCC) (Primary)  -     POC Urinalysis Dipstick, Multipro    2. Lower urinary tract symptoms (LUTS)    3. Impotence of organic origin    4. BPH (benign prostatic hypertrophy) with urinary obstruction    Patient with [place] 3+4 = 7 adenocarcinoma the prostate diagnosed on MRI fusion biopsy in April 2020.  He had a PI-RADS 5 lesion in the right anterior transition zone.  His volume was 28 cc.  He also had Oncotype DX score of 29 indicating intermediate risk disease.  He had a 46% chance of adverse pathology.  This included a 26% chance of upgrading to [place] 4+3 as well as a 27% chance of extraprostatic disease.     Patient wanted a RALP,  however he had a history of splenectomy and he was referred to [facility] where they agreed and he underwent radiation treatment completed November 2020.     PSA has decreased to 1.27.      Follow up in six months with repeat PSA.     Viagra not very effective for ED. Cialis somewhat more effective.  Continue Cialis.

## 2021-10-11 ENCOUNTER — OFFICE VISIT (OUTPATIENT)
Dept: UROLOGY | Facility: CLINIC | Age: 81
End: 2021-10-11

## 2021-10-11 VITALS — HEIGHT: 66 IN | TEMPERATURE: 97.7 F | WEIGHT: 162.2 LBS | BODY MASS INDEX: 26.07 KG/M2

## 2021-10-11 DIAGNOSIS — N52.9 IMPOTENCE OF ORGANIC ORIGIN: ICD-10-CM

## 2021-10-11 DIAGNOSIS — C61 PROSTATE CANCER (HCC): Primary | ICD-10-CM

## 2021-10-11 DIAGNOSIS — N13.8 BENIGN PROSTATIC HYPERPLASIA WITH URINARY OBSTRUCTION: ICD-10-CM

## 2021-10-11 DIAGNOSIS — R39.9 LOWER URINARY TRACT SYMPTOMS (LUTS): ICD-10-CM

## 2021-10-11 DIAGNOSIS — N40.1 BENIGN PROSTATIC HYPERPLASIA WITH URINARY OBSTRUCTION: ICD-10-CM

## 2021-10-11 LAB
BILIRUB BLD-MCNC: NEGATIVE MG/DL
CLARITY, POC: CLEAR
COLOR UR: YELLOW
GLUCOSE UR STRIP-MCNC: NEGATIVE MG/DL
KETONES UR QL: NEGATIVE
LEUKOCYTE EST, POC: NEGATIVE
NITRITE UR-MCNC: NEGATIVE MG/ML
PH UR: 6 [PH] (ref 5–8)
PROT UR STRIP-MCNC: NEGATIVE MG/DL
RBC # UR STRIP: NEGATIVE /UL
SP GR UR: 1.02 (ref 1–1.03)
UROBILINOGEN UR QL: ABNORMAL

## 2021-10-11 PROCEDURE — 99214 OFFICE O/P EST MOD 30 MIN: CPT | Performed by: UROLOGY

## 2021-10-11 PROCEDURE — 81001 URINALYSIS AUTO W/SCOPE: CPT | Performed by: UROLOGY

## 2021-11-02 PROCEDURE — U0005 INFEC AGEN DETEC AMPLI PROBE: HCPCS | Performed by: NURSE PRACTITIONER

## 2021-11-02 PROCEDURE — U0004 COV-19 TEST NON-CDC HGH THRU: HCPCS | Performed by: NURSE PRACTITIONER

## 2021-12-04 NOTE — TELEPHONE ENCOUNTER
Unable to leave  with contact number. Patient will need to follow up with Dr. Álvarez in May prior bone, CT pelvis. I am sending patient a reminder via mail.    [FreeTextEntry1] : viral syndrome\par Symptomatic treatment

## 2021-12-30 ENCOUNTER — TRANSCRIBE ORDERS (OUTPATIENT)
Dept: ADMINISTRATIVE | Facility: HOSPITAL | Age: 81
End: 2021-12-30

## 2021-12-30 DIAGNOSIS — R55 SYNCOPE AND COLLAPSE: Primary | ICD-10-CM

## 2022-01-03 ENCOUNTER — HOSPITAL ENCOUNTER (OUTPATIENT)
Dept: CARDIOLOGY | Facility: HOSPITAL | Age: 82
Discharge: HOME | End: 2022-01-03
Admitting: STUDENT IN AN ORGANIZED HEALTH CARE EDUCATION/TRAINING PROGRAM

## 2022-01-03 DIAGNOSIS — R55 SYNCOPE AND COLLAPSE: ICD-10-CM

## 2022-01-03 PROCEDURE — 93246 EXT ECG>7D<15D RECORDING: CPT

## 2022-01-24 LAB
MAXIMAL PREDICTED HEART RATE: 141 BPM
STRESS TARGET HR: 120 BPM

## 2022-01-24 PROCEDURE — 93248 EXT ECG>7D<15D REV&INTERPJ: CPT | Performed by: INTERNAL MEDICINE

## 2022-01-28 PROCEDURE — U0005 INFEC AGEN DETEC AMPLI PROBE: HCPCS | Performed by: NURSE PRACTITIONER

## 2022-01-28 PROCEDURE — U0004 COV-19 TEST NON-CDC HGH THRU: HCPCS | Performed by: NURSE PRACTITIONER

## 2022-02-11 ENCOUNTER — HOSPITAL ENCOUNTER (OUTPATIENT)
Dept: CARDIOLOGY | Facility: HOSPITAL | Age: 82
Discharge: HOME | End: 2022-02-11
Admitting: STUDENT IN AN ORGANIZED HEALTH CARE EDUCATION/TRAINING PROGRAM

## 2022-02-11 VITALS
SYSTOLIC BLOOD PRESSURE: 135 MMHG | HEIGHT: 66 IN | DIASTOLIC BLOOD PRESSURE: 77 MMHG | BODY MASS INDEX: 26.03 KG/M2 | WEIGHT: 162 LBS

## 2022-02-11 DIAGNOSIS — R55 SYNCOPE AND COLLAPSE: ICD-10-CM

## 2022-02-11 LAB
BH CV ECHO MEAS - AO MAX PG (FULL): 1 MMHG
BH CV ECHO MEAS - AO MAX PG: 4.2 MMHG
BH CV ECHO MEAS - AO MEAN PG (FULL): 1 MMHG
BH CV ECHO MEAS - AO MEAN PG: 2 MMHG
BH CV ECHO MEAS - AO ROOT AREA (BSA CORRECTED): 1.9
BH CV ECHO MEAS - AO ROOT AREA: 9.6 CM^2
BH CV ECHO MEAS - AO ROOT DIAM: 3.5 CM
BH CV ECHO MEAS - AO V2 MAX: 103 CM/SEC
BH CV ECHO MEAS - AO V2 MEAN: 71.1 CM/SEC
BH CV ECHO MEAS - AO V2 VTI: 23.7 CM
BH CV ECHO MEAS - AVA(I,A): 2.7 CM^2
BH CV ECHO MEAS - AVA(I,D): 2.7 CM^2
BH CV ECHO MEAS - AVA(V,A): 2.7 CM^2
BH CV ECHO MEAS - AVA(V,D): 2.7 CM^2
BH CV ECHO MEAS - BSA(HAYCOCK): 1.9 M^2
BH CV ECHO MEAS - BSA: 1.8 M^2
BH CV ECHO MEAS - BZI_BMI: 26.1 KILOGRAMS/M^2
BH CV ECHO MEAS - BZI_METRIC_HEIGHT: 167.6 CM
BH CV ECHO MEAS - BZI_METRIC_WEIGHT: 73.5 KG
BH CV ECHO MEAS - EDV(CUBED): 99.9 ML
BH CV ECHO MEAS - EDV(MOD-SP4): 60.7 ML
BH CV ECHO MEAS - EDV(TEICH): 99.3 ML
BH CV ECHO MEAS - EF(CUBED): 69.3 %
BH CV ECHO MEAS - EF(MOD-SP4): 54.9 %
BH CV ECHO MEAS - EF(TEICH): 60.9 %
BH CV ECHO MEAS - ESV(CUBED): 30.7 ML
BH CV ECHO MEAS - ESV(MOD-SP4): 27.4 ML
BH CV ECHO MEAS - ESV(TEICH): 38.8 ML
BH CV ECHO MEAS - FS: 32.5 %
BH CV ECHO MEAS - IVS/LVPW: 0.88
BH CV ECHO MEAS - IVSD: 0.93 CM
BH CV ECHO MEAS - LA DIMENSION: 3 CM
BH CV ECHO MEAS - LA/AO: 0.86
BH CV ECHO MEAS - LAT PEAK E' VEL: 7.5 CM/SEC
BH CV ECHO MEAS - LV DIASTOLIC VOL/BSA (35-75): 33.2 ML/M^2
BH CV ECHO MEAS - LV MASS(C)D: 159.7 GRAMS
BH CV ECHO MEAS - LV MASS(C)DI: 87.3 GRAMS/M^2
BH CV ECHO MEAS - LV MAX PG: 3.2 MMHG
BH CV ECHO MEAS - LV MEAN PG: 1 MMHG
BH CV ECHO MEAS - LV SYSTOLIC VOL/BSA (12-30): 15 ML/M^2
BH CV ECHO MEAS - LV V1 MAX: 89.4 CM/SEC
BH CV ECHO MEAS - LV V1 MEAN: 55.2 CM/SEC
BH CV ECHO MEAS - LV V1 VTI: 20.4 CM
BH CV ECHO MEAS - LVIDD: 4.6 CM
BH CV ECHO MEAS - LVIDS: 3.1 CM
BH CV ECHO MEAS - LVLD AP4: 7.2 CM
BH CV ECHO MEAS - LVLS AP4: 6.2 CM
BH CV ECHO MEAS - LVOT AREA (M): 3.1 CM^2
BH CV ECHO MEAS - LVOT AREA: 3.1 CM^2
BH CV ECHO MEAS - LVOT DIAM: 2 CM
BH CV ECHO MEAS - LVPWD: 1.1 CM
BH CV ECHO MEAS - MED PEAK E' VEL: 5.5 CM/SEC
BH CV ECHO MEAS - MV A MAX VEL: 60.6 CM/SEC
BH CV ECHO MEAS - MV DEC SLOPE: 318 CM/SEC^2
BH CV ECHO MEAS - MV DEC TIME: 0.19 SEC
BH CV ECHO MEAS - MV E MAX VEL: 68.4 CM/SEC
BH CV ECHO MEAS - MV E/A: 1.1
BH CV ECHO MEAS - MV P1/2T MAX VEL: 70.4 CM/SEC
BH CV ECHO MEAS - MV P1/2T: 64.8 MSEC
BH CV ECHO MEAS - MVA P1/2T LCG: 3.1 CM^2
BH CV ECHO MEAS - MVA(P1/2T): 3.4 CM^2
BH CV ECHO MEAS - PA MAX PG: 5.2 MMHG
BH CV ECHO MEAS - PA V2 MAX: 114 CM/SEC
BH CV ECHO MEAS - RAP SYSTOLE: 5 MMHG
BH CV ECHO MEAS - RVSP: 32.5 MMHG
BH CV ECHO MEAS - SI(AO): 124.7 ML/M^2
BH CV ECHO MEAS - SI(CUBED): 37.9 ML/M^2
BH CV ECHO MEAS - SI(LVOT): 35 ML/M^2
BH CV ECHO MEAS - SI(MOD-SP4): 18.2 ML/M^2
BH CV ECHO MEAS - SI(TEICH): 33.1 ML/M^2
BH CV ECHO MEAS - SV(AO): 228 ML
BH CV ECHO MEAS - SV(CUBED): 69.2 ML
BH CV ECHO MEAS - SV(LVOT): 64.1 ML
BH CV ECHO MEAS - SV(MOD-SP4): 33.3 ML
BH CV ECHO MEAS - SV(TEICH): 60.5 ML
BH CV ECHO MEAS - TR MAX VEL: 262 CM/SEC
BH CV ECHO MEASUREMENTS AVERAGE E/E' RATIO: 10.52
LEFT ATRIUM VOLUME INDEX: 32 ML/M2
MAXIMAL PREDICTED HEART RATE: 141 BPM
STRESS TARGET HR: 120 BPM

## 2022-02-11 PROCEDURE — 93306 TTE W/DOPPLER COMPLETE: CPT

## 2022-02-11 PROCEDURE — 93306 TTE W/DOPPLER COMPLETE: CPT | Performed by: INTERNAL MEDICINE

## 2022-02-22 ENCOUNTER — OFFICE VISIT (OUTPATIENT)
Dept: OTOLARYNGOLOGY | Facility: CLINIC | Age: 82
End: 2022-02-22

## 2022-02-22 VITALS
HEIGHT: 66 IN | WEIGHT: 164.8 LBS | HEART RATE: 74 BPM | TEMPERATURE: 97.8 F | DIASTOLIC BLOOD PRESSURE: 94 MMHG | BODY MASS INDEX: 26.48 KG/M2 | SYSTOLIC BLOOD PRESSURE: 154 MMHG

## 2022-02-22 DIAGNOSIS — H93.19 TINNITUS, UNSPECIFIED LATERALITY: ICD-10-CM

## 2022-02-22 DIAGNOSIS — H90.3 SENSORINEURAL HEARING LOSS (SNHL) OF BOTH EARS: Primary | ICD-10-CM

## 2022-02-22 PROCEDURE — 99212 OFFICE O/P EST SF 10 MIN: CPT | Performed by: EMERGENCY MEDICINE

## 2022-02-22 RX ORDER — FLUTICASONE PROPIONATE 50 MCG
2 SPRAY, SUSPENSION (ML) NASAL DAILY
Qty: 16 G | Refills: 11 | Status: SHIPPED | OUTPATIENT
Start: 2022-02-22

## 2022-02-22 NOTE — PATIENT INSTRUCTIONS
CONTACT INFORMATION:  The main office phone number is [phone]. For emergencies after hours and on weekends, this number will convert over to our answering service and the on call provider will answer. Please try to keep non emergent phone calls/ questions to office hours 9am-5pm Monday through Friday.     [employer]  As an alternative, you can sign up and use the Epic MyChart system for more direct and quicker access for non emergent questions/ problems.  [facility] [employer] allows you to send messages to your doctor, view your test results, renew your prescriptions, schedule appointments, and more. To sign up, go to [employer] and click on the Sign Up Now link in the New User? box. Enter your [employer] Activation Code exactly as it appears below along with the last four digits of your Social Security Number and your Date of Birth () to complete the sign-up process. If you do not sign up before the expiration date, you must request a new code.    [employer] Activation Code: Activation code not generated  Current [employer] Status: Active    If you have questions, you can email [email or web address] or call [phone] to talk to our [employer] staff. Remember, [employer] is NOT to be used for urgent needs. For medical emergencies, dial 911.

## 2022-02-22 NOTE — PROGRESS NOTES
GIBSON Torres ENT [facility]  [address], [address]  [facility] [address]  Fax [phone]  Phone [phone]      Visit Type: FOLLOW UP   Chief Complaint   Patient presents with   • Follow-up   • Ear Problem   • Tinnitus        HPI  He presents for a follow up evaluation. He has had no current complaints. .     Past Medical History:   Diagnosis Date   • Arthritis    • BPH (benign prostatic hypertrophy)    • Diabetes mellitus (HCC)    • Elevated PSA    • Hyperlipemia    • Hypertension    • Hypothyroidism    • Impotence of organic origin    • Pernicious anemia    • PONV (postoperative nausea and vomiting)    • Prostate CA (HCC)        Past Surgical History:   Procedure Laterality Date   • ARM LESION/CYST EXCISION Left 7/13/2020    Procedure: WIDE EXCISION SKIN LESION LEFT FOREARM;  Surgeon: Yoli Joshi MD;  Location: [facility] OR;  Service: General;  Laterality: Left;   • CATARACT EXTRACTION     • COLONOSCOPY     • HERNIA REPAIR     • PROSTATE BIOPSY N/A 4/3/2020    Procedure: PROSTATE ULTRASOUND BIOPSY MRI FUSION WITH URONAV;  Surgeon: Robert Álvarez MD;  Location: [facility] OR;  Service: Urology;  Laterality: N/A;   • PROSTATE SURGERY     • SPLENECTOMY  2000       Family History: His family history includes No Known Problems in his father and mother.     Social History: He  reports that he has never smoked. He has never used smokeless tobacco. He reports that he does not drink alcohol and does not use drugs.    Home Medications:  Calcium Citrate-Vitamin D, Chlorpheniramine-DM, Cyanocobalamin, Levothyroxine Sodium, Vitamin D3, aspirin, fluticasone, fosinopril, hydroCHLOROthiazide, lovastatin, metFORMIN, multivitamin with minerals, and tadalafil    Allergies:  He has No Known Allergies.       Vital Signs:   Temp:  [97.8 °F (36.6 °C)] 97.8 °F (36.6 °C)  Heart Rate:  [74] 74  BP: (154)/(94) 154/94  ENT Physical  Exam  Constitutional  Appearance: patient appears well-developed, well-nourished and well-groomed,  Communication/Voice: communication appropriate for developmental age; vocal quality normal;  Head and Face  Appearance: head appears normal, face appears normal and face appears atraumatic;  Palpation: facial palpation normal;  Salivary: glands normal;  Ear  Hearing: intact;  Auricles: right auricle normal; left auricle normal;  External Mastoids: right external mastoid normal; left external mastoid normal;  Ear Canals: right ear canal normal; left ear canal normal;  Tympanic Membranes: right tympanic membrane normal; left tympanic membrane normal;  Nose  External Nose: nares patent bilaterally;  Internal Nose: bilateral inferior turbinates with hypertrophy;  Oral Cavity/Oropharynx  Lips: normal;  Teeth: normal;  Gums: gingiva normal;  Tongue: normal;  Oral mucosa: normal;  Hard palate: normal;         Result Review    RESULTS REVIEW    I have reviewed the patients old records in the chart.     Assessment/Plan    Diagnoses and all orders for this visit:    1. Sensorineural hearing loss (SNHL) of both ears (Primary)    2. Tinnitus, unspecified laterality    Other orders  -     fluticasone (FLONASE) 50 MCG/ACT nasal spray; 2 sprays into the nostril(s) as directed by provider Daily.  Dispense: 16 g; Refill: 11            Use hearing protection in loud noise situations.  Continue hearing aid use.  Obtain a yearly audiogram to follow hearing.      Return in about 1 year (around 2/22/2023) for Follow up with GIBSON Manzo.      GIBSON Torres  02/22/22  10:49 CST

## 2022-04-05 DIAGNOSIS — C61 PROSTATE CANCER: Primary | ICD-10-CM

## 2022-04-06 LAB — PSA SERPL-MCNC: 0.57 NG/ML (ref 0–4)

## 2022-04-08 NOTE — PROGRESS NOTES
Subjective    Mr. Granados is 81 y.o. male    Chief Complaint: Prostate Cancer    History of Present Illness  He is here today to discuss his  prostate cancer.  His biopsy was done 6/2020. This was done in the context of Elevated PSA. Severity best described as adenocarcinoma in 1/7 cores with the maximum tracy grade of 3+4.  Patient did not fill out  AUA score.The patients potency status can be defined as Impotence with some response to PDE 5 inhibitors.  Status post SBRT completed November 2020 at [facility].  PSA 0.57.    Lab Results   Component Value Date    PSA 0.570 04/05/2022    PSA 1.18 12/08/2021    PSA 1.270 10/04/2021         The following portions of the patient's history were reviewed and updated as appropriate: allergies, current medications, past family history, past medical history, past social history, past surgical history and problem list.    Review of Systems   Constitutional: Negative for chills and fever.   Gastrointestinal: Negative for abdominal pain, anal bleeding and blood in stool.   Genitourinary: Negative for dysuria, frequency, hematuria and urgency.         Current Outpatient Medications:   •  aspirin 81 MG EC tablet, Take 81 mg by mouth Every Other Day., Disp: , Rfl:   •  Calcium Citrate-Vitamin D (CALCIUM + D PO), Take 1 tablet by mouth 2 (Two) Times a Week., Disp: , Rfl:   •  Cholecalciferol (VITAMIN D3) 1000 UNITS capsule, Take 2,000 Units by mouth Daily., Disp: , Rfl:   •  Cyanocobalamin (VITAMIN B-12 IJ), Inject  as directed Every 30 (Thirty) Days., Disp: , Rfl:   •  fluticasone (FLONASE) 50 MCG/ACT nasal spray, 2 sprays into the nostril(s) as directed by provider Daily., Disp: 16 g, Rfl: 11  •  fosinopril (MONOPRIL) 40 MG tablet, Take 40 mg by mouth Daily., Disp: , Rfl:   •  hydrochlorothiazide (HYDRODIURIL) 25 MG tablet, Take 25 mg by mouth Daily., Disp: , Rfl:   •  LEVOTHYROXINE SODIUM PO, Take 75 mcg by mouth Daily., Disp: , Rfl:   •  lovastatin  (MEVACOR) 20 MG tablet, Take 20 mg by mouth Every Night., Disp: , Rfl:   •  metFORMIN (GLUCOPHAGE) 500 MG tablet, Take 500 mg by mouth Daily With Breakfast., Disp: , Rfl:   •  Multiple Vitamins-Minerals (OCUVITE PO), Take 1 tablet by mouth Daily., Disp: , Rfl:   •  tadalafil (CIALIS) 20 MG tablet, Take 1 tablet by mouth Daily As Needed for Erectile Dysfunction., Disp: 10 tablet, Rfl: 11  •  Chlorpheniramine-DM (CORICIDIN COUGH/COLD) 4-30 MG tablet, Take 1 tablet by mouth 2 (Two) Times a Day As Needed (congestion and cough)., Disp: 12 each, Rfl: 0  •  tadalafil (CIALIS) 5 MG tablet, Take 5 mg by mouth Daily., Disp: , Rfl:     Past Medical History:   Diagnosis Date   • Arthritis    • BPH (benign prostatic hypertrophy)    • Diabetes mellitus (HCC)    • Elevated PSA    • Hyperlipemia    • Hypertension    • Hypothyroidism    • Impotence of organic origin    • Pernicious anemia    • PONV (postoperative nausea and vomiting)    • Prostate CA (HCC)        Past Surgical History:   Procedure Laterality Date   • ARM LESION/CYST EXCISION Left 7/13/2020    Procedure: WIDE EXCISION SKIN LESION LEFT FOREARM;  Surgeon: Yoli Joshi MD;  Location: [facility] OR;  Service: General;  Laterality: Left;   • CATARACT EXTRACTION     • COLONOSCOPY     • HERNIA REPAIR     • PROSTATE BIOPSY N/A 4/3/2020    Procedure: PROSTATE ULTRASOUND BIOPSY MRI FUSION WITH URONAV;  Surgeon: Robert Álvarez MD;  Location: [facility] OR;  Service: Urology;  Laterality: N/A;   • PROSTATE SURGERY     • SPLENECTOMY  2000       Social History     Socioeconomic History   • Marital status:    Tobacco Use   • Smoking status: Never Smoker   • Smokeless tobacco: Never Used   Vaping Use   • Vaping Use: Never used   Substance and Sexual Activity   • Alcohol use: No   • Drug use: Never   • Sexual activity: Defer       Family History   Problem Relation Age of Onset   • No Known Problems Father    • No Known Problems Mother    • Prostate cancer Neg Hx   "      Objective    Temp 98.2 °F (36.8 °C) (Temporal)   Ht 167.6 cm (66\")   Wt 74.4 kg (164 lb)   BMI 26.47 kg/m²     Physical Exam        Results for orders placed or performed in visit on 04/05/22   PSA DIAGNOSTIC    Specimen: Blood   Result Value Ref Range    PSA 0.570 0.000 - 4.000 ng/mL     Assessment and Plan    Diagnoses and all orders for this visit:    1. Prostate cancer (HCC) (Primary)  -     Cancel: POC Urinalysis Dipstick, Multipro  -     PSA DIAGNOSTIC; Future    2. BPH (benign prostatic hypertrophy) with urinary obstruction    3. 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    PSA 0.57.    Follow-up in 6 months.    "

## 2022-04-11 ENCOUNTER — OFFICE VISIT (OUTPATIENT)
Dept: UROLOGY | Facility: CLINIC | Age: 82
End: 2022-04-11

## 2022-04-11 VITALS — HEIGHT: 66 IN | TEMPERATURE: 98.2 F | BODY MASS INDEX: 26.36 KG/M2 | WEIGHT: 164 LBS

## 2022-04-11 DIAGNOSIS — N40.1 BENIGN PROSTATIC HYPERPLASIA WITH URINARY OBSTRUCTION: ICD-10-CM

## 2022-04-11 DIAGNOSIS — N13.8 BENIGN PROSTATIC HYPERPLASIA WITH URINARY OBSTRUCTION: ICD-10-CM

## 2022-04-11 DIAGNOSIS — C61 PROSTATE CANCER: Primary | ICD-10-CM

## 2022-04-11 DIAGNOSIS — N52.9 IMPOTENCE DUE TO ERECTILE DYSFUNCTION: ICD-10-CM

## 2022-04-11 PROCEDURE — 99213 OFFICE O/P EST LOW 20 MIN: CPT | Performed by: UROLOGY

## 2022-09-29 ENCOUNTER — LAB (OUTPATIENT)
Dept: UROLOGY | Facility: CLINIC | Age: 82
End: 2022-09-29

## 2022-09-29 DIAGNOSIS — C61 PROSTATE CANCER: ICD-10-CM

## 2022-09-30 LAB — PSA SERPL-MCNC: 0.3 NG/ML (ref 0–4)

## 2022-10-03 NOTE — PROGRESS NOTES
Subjective    Mr. Granados is 82 y.o. male    Chief Complaint: Prostate cancer    History of Present Illness  Patient history of prostate cancer diagnosed in June 2020.  Patient had [place] 3+4 equal 7 in 1 out of 7 cores.  PSA was 11.9.  Patient underwent SBRT therapy in [place] completed November 2020.AUA score.  AUA 13/35.  Voiding symptoms include incomplete emptying, frequency, intermittency, urgency, weak stream, nocturia X 1.  Patient with ED taking oral PDE 5 inhibitors with Cialis daily and Viagra prn.  Lab Results   Component Value Date    PSA 0.297 09/29/2022    PSA 0.40 06/27/2022    PSA 0.570 04/05/2022       The following portions of the patient's history were reviewed and updated as appropriate: allergies, current medications, past family history, past medical history, past social history, past surgical history and problem list.    Review of Systems   Constitutional: Negative for chills and fever.   Gastrointestinal: Negative for abdominal pain, anal bleeding and blood in stool.   Genitourinary: Positive for frequency and urgency. Negative for difficulty urinating, dysuria and hematuria.         Current Outpatient Medications:   •  aspirin 81 MG EC tablet, Take 81 mg by mouth Every Other Day., Disp: , Rfl:   •  Calcium Citrate-Vitamin D (CALCIUM + D PO), Take 1 tablet by mouth 2 (Two) Times a Week., Disp: , Rfl:   •  Cholecalciferol (VITAMIN D3) 1000 UNITS capsule, Take 2,000 Units by mouth Daily., Disp: , Rfl:   •  Cyanocobalamin (VITAMIN B-12 IJ), Inject  as directed Every 30 (Thirty) Days., Disp: , Rfl:   •  fluticasone (FLONASE) 50 MCG/ACT nasal spray, 2 sprays into the nostril(s) as directed by provider Daily., Disp: 16 g, Rfl: 11  •  fosinopril (MONOPRIL) 40 MG tablet, Take 40 mg by mouth Daily., Disp: , Rfl:   •  hydrochlorothiazide (HYDRODIURIL) 25 MG tablet, Take 25 mg by mouth Daily., Disp: , Rfl:   •  LEVOTHYROXINE SODIUM PO, Take 75 mcg by mouth Daily., Disp: , Rfl:   •  lovastatin  "(MEVACOR) 20 MG tablet, Take 20 mg by mouth Every Night., Disp: , Rfl:   •  metFORMIN (GLUCOPHAGE) 500 MG tablet, Take 500 mg by mouth Daily With Breakfast., Disp: , Rfl:   •  Multiple Vitamins-Minerals (OCUVITE PO), Take 1 tablet by mouth Daily., Disp: , Rfl:   •  tadalafil (CIALIS) 20 MG tablet, Take 1 tablet by mouth Daily As Needed for Erectile Dysfunction., Disp: 10 tablet, Rfl: 11  •  Chlorpheniramine-DM (CORICIDIN COUGH/COLD) 4-30 MG tablet, Take 1 tablet by mouth 2 (Two) Times a Day As Needed (congestion and cough)., Disp: 12 each, Rfl: 0    Past Medical History:   Diagnosis Date   • Arthritis    • BPH (benign prostatic hypertrophy)    • Diabetes mellitus (HCC)    • Elevated PSA    • Hyperlipemia    • Hypertension    • Hypothyroidism    • Impotence of organic origin    • Pernicious anemia    • PONV (postoperative nausea and vomiting)    • Prostate CA (HCC)        Past Surgical History:   Procedure Laterality Date   • ARM LESION/CYST EXCISION Left 7/13/2020    Procedure: WIDE EXCISION SKIN LESION LEFT FOREARM;  Surgeon: Yoli Joshi MD;  Location: [facility] OR;  Service: General;  Laterality: Left;   • CATARACT EXTRACTION     • COLONOSCOPY     • HERNIA REPAIR     • PROSTATE BIOPSY N/A 4/3/2020    Procedure: PROSTATE ULTRASOUND BIOPSY MRI FUSION WITH URONAV;  Surgeon: Robert Álvarez MD;  Location: [facility] OR;  Service: Urology;  Laterality: N/A;   • PROSTATE SURGERY     • SPLENECTOMY  2000       Social History     Socioeconomic History   • Marital status:    Tobacco Use   • Smoking status: Never   • Smokeless tobacco: Never   Vaping Use   • Vaping Use: Never used   Substance and Sexual Activity   • Alcohol use: No   • Drug use: Never   • Sexual activity: Defer       Family History   Problem Relation Age of Onset   • No Known Problems Father    • No Known Problems Mother    • Prostate cancer Neg Hx        Objective    Temp 97.4 °F (36.3 °C)   Ht 167.6 cm (66\")   Wt 74.8 kg (165 lb)   BMI 26.63 " kg/m²     Physical Exam        Results for orders placed or performed in visit on 10/10/22   POC Urinalysis Dipstick, Multipro    Specimen: Urine   Result Value Ref Range    Color Yellow Yellow, Straw, Dark Yellow, [place]    Clarity, UA Clear Clear    Glucose, UA Negative Negative mg/dL    Bilirubin Negative Negative    Ketones, UA Negative Negative    Specific Gravity  1.015 1.005 - 1.030    Blood, UA Negative Negative    pH, Urine 6.5 5.0 - 8.0    Protein, POC Negative Negative mg/dL    Urobilinogen, UA 0.2 E.U./dL Normal, 0.2 E.U./dL    Nitrite, UA Negative Negative    Leukocytes Negative Negative     Assessment and Plan    Diagnoses and all orders for this visit:    1. Prostate cancer (HCC) (Primary)  -     POC Urinalysis Dipstick, Multipro    2. Lower urinary tract symptoms (LUTS)    3. Impotence of organic origin      Reviewed notes from [place] indicating the patient had some issues with urinary retention however last postvoid residual there was 0.  He is on daily Cialis as well as Viagra per their recommendations.    AUA symptom score 13/35.    Erectile function is adequate.    Status post SBRT completed in November 2020 at [place].  PSA has responded nicely.  He will follow-up in 1 year with PSA.

## 2022-10-10 ENCOUNTER — OFFICE VISIT (OUTPATIENT)
Dept: UROLOGY | Facility: CLINIC | Age: 82
End: 2022-10-10

## 2022-10-10 VITALS — HEIGHT: 66 IN | WEIGHT: 165 LBS | TEMPERATURE: 97.4 F | BODY MASS INDEX: 26.52 KG/M2

## 2022-10-10 DIAGNOSIS — R39.9 LOWER URINARY TRACT SYMPTOMS (LUTS): ICD-10-CM

## 2022-10-10 DIAGNOSIS — C61 PROSTATE CANCER: Primary | ICD-10-CM

## 2022-10-10 DIAGNOSIS — N52.9 IMPOTENCE OF ORGANIC ORIGIN: ICD-10-CM

## 2022-10-10 PROCEDURE — 81001 URINALYSIS AUTO W/SCOPE: CPT | Performed by: UROLOGY

## 2022-10-10 PROCEDURE — 99213 OFFICE O/P EST LOW 20 MIN: CPT | Performed by: UROLOGY

## 2023-02-03 ENCOUNTER — TELEPHONE (OUTPATIENT)
Dept: OTOLARYNGOLOGY | Facility: CLINIC | Age: 83
End: 2023-02-03
Payer: MEDICARE

## 2023-02-03 NOTE — TELEPHONE ENCOUNTER
Notified that appointment on 2/22/2023 been rescheduled to Monday, March 27, 2023, also mailed reminder letter

## 2023-03-27 ENCOUNTER — OFFICE VISIT (OUTPATIENT)
Dept: OTOLARYNGOLOGY | Facility: CLINIC | Age: 83
End: 2023-03-27
Payer: MEDICARE

## 2023-03-27 VITALS — DIASTOLIC BLOOD PRESSURE: 81 MMHG | TEMPERATURE: 97.7 F | HEART RATE: 78 BPM | SYSTOLIC BLOOD PRESSURE: 129 MMHG

## 2023-03-27 DIAGNOSIS — H90.3 SENSORINEURAL HEARING LOSS (SNHL) OF BOTH EARS: Primary | ICD-10-CM

## 2023-03-27 DIAGNOSIS — H61.23 BILATERAL IMPACTED CERUMEN: ICD-10-CM

## 2023-03-27 PROCEDURE — 99213 OFFICE O/P EST LOW 20 MIN: CPT | Performed by: EMERGENCY MEDICINE

## 2023-03-27 PROCEDURE — 3074F SYST BP LT 130 MM HG: CPT | Performed by: EMERGENCY MEDICINE

## 2023-03-27 PROCEDURE — 1159F MED LIST DOCD IN RCRD: CPT | Performed by: EMERGENCY MEDICINE

## 2023-03-27 PROCEDURE — 3079F DIAST BP 80-89 MM HG: CPT | Performed by: EMERGENCY MEDICINE

## 2023-03-27 PROCEDURE — 69210 REMOVE IMPACTED EAR WAX UNI: CPT | Performed by: EMERGENCY MEDICINE

## 2023-03-27 PROCEDURE — 1160F RVW MEDS BY RX/DR IN RCRD: CPT | Performed by: EMERGENCY MEDICINE

## 2023-03-27 NOTE — PROGRESS NOTES
[facility]  [address], [address]  [facility] [address]  Fax [phone]  Phone [phone]      Visit Type: FOLLOW UP   Chief Complaint   Patient presents with   • Ear Problem        HPI  He presents for a follow up evaluation. He denies any issues.  He does not have tinnitus when wearing his hearing aids.     Past Medical History:   Diagnosis Date   • Arthritis    • BPH (benign prostatic hypertrophy)    • Diabetes mellitus (HCC)    • Elevated PSA    • Hyperlipemia    • Hypertension    • Hypothyroidism    • Impotence of organic origin    • Pernicious anemia    • PONV (postoperative nausea and vomiting)    • Prostate CA (HCC)        Past Surgical History:   Procedure Laterality Date   • ARM LESION/CYST EXCISION Left 7/13/2020    Procedure: WIDE EXCISION SKIN LESION LEFT FOREARM;  Surgeon: Yoli Joshi MD;  Location: [facility];  Service: General;  Laterality: Left;   • CATARACT EXTRACTION     • COLONOSCOPY     • HERNIA REPAIR     • PROSTATE BIOPSY N/A 4/3/2020    Procedure: PROSTATE ULTRASOUND BIOPSY MRI FUSION WITH URONAV;  Surgeon: Robert Álvarez MD;  Location: [facility] OR;  Service: Urology;  Laterality: N/A;   • PROSTATE SURGERY     • SPLENECTOMY  2000       Family History: His family history includes No Known Problems in his father and mother.     Social History: He  reports that he has never smoked. He has never used smokeless tobacco. He reports that he does not drink alcohol and does not use drugs.    Home Medications:  Calcium Citrate-Vitamin D, Chlorpheniramine-DM, Cyanocobalamin, Levothyroxine Sodium, Vitamin D3, aspirin, fluticasone, fosinopril, hydroCHLOROthiazide, lovastatin, metFORMIN, multivitamin with minerals, and tadalafil    Allergies:  He has No Known Allergies.       Vital Signs:   Temp:  [97.7 °F (36.5 °C)] 97.7 °F (36.5 °C)  Heart Rate:  [78] 78  BP: (129)/(81) 129/81  ENT Physical  Exam  Ear  Hearing: intact;  Auricles: right auricle normal; left auricle normal;  External Mastoids: right external mastoid normal; left external mastoid normal;  Ear Canals: bilateral ear canals impacted cerumen observed;     Ear Microscopy with Bilateral Cerumen Removal    Date/Time: 3/27/2023 1:39 PM  Performed by: Nicole Celeste APRN  Authorized by: Nicole Celeste APRN     Ear examination was performed utilizing binocular microscopy.  Right auricle:   normal:   Right ear canal:   impacted cerumen.   Left auricle:   normal:   Left ear canal:   impacted cerumen.     Procedure:    Cerumen was removed from the bilateral ears with a forceps and a suction.   Post-procedure details:     Inspection after the procedure revealed an intact TM.    No medication was applied to the ear canal.    The procedure was tolerated well, no immediate complications.       Result Review    RESULTS REVIEW    I have reviewed the patients old records in the chart.     Assessment & Plan    Diagnoses and all orders for this visit:    1. Sensorineural hearing loss (SNHL) of both ears (Primary)    2. Bilateral impacted cerumen    Other orders  -     $ Binocular Microscopy       Use hearing protection in loud noise situations.  Continue hearing aid use.    Return in about 1 year (around 3/27/2024) for Follow up with GIBSON Meyer for ear cleaning.      GIBSON Torres   03/27/23  13:39 CDT

## 2023-04-20 ENCOUNTER — TRANSCRIBE ORDERS (OUTPATIENT)
Dept: ADMINISTRATIVE | Facility: HOSPITAL | Age: 83
End: 2023-04-20
Payer: MEDICARE

## 2023-04-20 DIAGNOSIS — I73.9 PERIPHERAL VASCULAR DISEASE, UNSPECIFIED: Primary | ICD-10-CM

## 2023-05-02 ENCOUNTER — HOSPITAL ENCOUNTER (OUTPATIENT)
Dept: ULTRASOUND IMAGING | Facility: HOSPITAL | Age: 83
Discharge: HOME | End: 2023-05-02
Admitting: STUDENT IN AN ORGANIZED HEALTH CARE EDUCATION/TRAINING PROGRAM
Payer: MEDICARE

## 2023-05-02 DIAGNOSIS — I73.9 PERIPHERAL VASCULAR DISEASE, UNSPECIFIED: ICD-10-CM

## 2023-05-02 PROCEDURE — 93923 UPR/LXTR ART STDY 3+ LVLS: CPT

## 2023-10-12 NOTE — PROGRESS NOTES
Subjective    Mr. Granados is 83 y.o. male    Chief Complaint: Prostate Cancer     History of Present Illness    Patient history of prostate cancer diagnosed in June 2020.  Patient had [place] 3+4 equal 7 in 1 out of 7 cores.  PSA was 11.9.  Patient underwent SBRT therapy in [place] completed November 2020.  AUA score.  AUA 17/35.  Voiding symptoms include incomplete emptying, frequency, intermittency, urgency, weak stream, nocturia X 1.  Patient with ED taking oral PDE 5 inhibitors with Cialis daily and Viagra prn.     Lab Results   Component Value Date    PSA 0.042 10/17/2023    PSA <0.10 07/11/2023    PSA 0.297 09/29/2022       The following portions of the patient's history were reviewed and updated as appropriate: allergies, current medications, past family history, past medical history, past social history, past surgical history and problem list.    Review of Systems      Current Outpatient Medications:     aspirin 81 MG EC tablet, Take 1 tablet by mouth Every Other Day., Disp: , Rfl:     azelastine (ASTEPRO) 0.15 % solution nasal spray, Spray 1 spray twice a day by intranasal route., Disp: , Rfl:     Calcium Citrate-Vitamin D (CALCIUM + D PO), Take 1 tablet by mouth 2 (Two) Times a Week., Disp: , Rfl:     Chlorpheniramine-DM (CORICIDIN COUGH/COLD) 4-30 MG tablet, Take 1 tablet by mouth 2 (Two) Times a Day As Needed (congestion and cough)., Disp: 12 each, Rfl: 0    Cholecalciferol (VITAMIN D3) 1000 UNITS capsule, Take 2 capsules by mouth Daily., Disp: , Rfl:     cilostazol (PLETAL) 50 MG tablet, Take 1 tablet twice a day by oral route., Disp: , Rfl:     Cyanocobalamin (VITAMIN B-12 IJ), Inject  as directed Every 30 (Thirty) Days., Disp: , Rfl:     Diclofenac Sodium (VOLTAREN) 1 % gel gel, APPLY 2 GRAMS TO THE AFFECTED AREA(S) BY TOPICAL ROUTE 2 TIMES PER DAY PRN, Disp: , Rfl:     fluticasone (FLONASE) 50 MCG/ACT nasal spray, 2 sprays into the nostril(s) as directed by provider Daily., Disp: 16 g, Rfl: 11     fosinopril (MONOPRIL) 40 MG tablet, Take 1 tablet by mouth Daily., Disp: , Rfl:     hydrochlorothiazide (HYDRODIURIL) 25 MG tablet, Take 1 tablet by mouth Daily., Disp: , Rfl:     LEVOTHYROXINE SODIUM PO, Take 75 mcg by mouth Daily., Disp: , Rfl:     lovastatin (MEVACOR) 20 MG tablet, Take 1 tablet by mouth Every Night., Disp: , Rfl:     metFORMIN (GLUCOPHAGE) 500 MG tablet, Take 1 tablet by mouth Daily With Breakfast., Disp: , Rfl:     Multiple Vitamins-Minerals (OCUVITE PO), Take 1 tablet by mouth Daily., Disp: , Rfl:     sildenafil (REVATIO) 20 MG tablet, Take 1 tablet by mouth Daily As Needed., Disp: , Rfl:     tadalafil (Cialis) 5 MG tablet, Take 1 tablet every day by oral route as needed., Disp: , Rfl:     Past Medical History:   Diagnosis Date    Arthritis     BPH (benign prostatic hypertrophy)     Diabetes mellitus     Elevated PSA     Hyperlipemia     Hypertension     Hypothyroidism     Impotence of organic origin     Pernicious anemia     PONV (postoperative nausea and vomiting)     Prostate CA        Past Surgical History:   Procedure Laterality Date    ARM LESION/CYST EXCISION Left 7/13/2020    Procedure: WIDE EXCISION SKIN LESION LEFT FOREARM;  Surgeon: Yoli Joshi MD;  Location:  [facility] OR;  Service: General;  Laterality: Left;    CATARACT EXTRACTION      COLONOSCOPY      HERNIA REPAIR      PROSTATE BIOPSY N/A 4/3/2020    Procedure: PROSTATE ULTRASOUND BIOPSY MRI FUSION WITH URONAV;  Surgeon: Robert Álvarez MD;  Location:  [facility] OR;  Service: Urology;  Laterality: N/A;    PROSTATE SURGERY      SPLENECTOMY  2000       Social History     Socioeconomic History    Marital status:    Tobacco Use    Smoking status: Never    Smokeless tobacco: Never   Vaping Use    Vaping Use: Never used   Substance and Sexual Activity    Alcohol use: No    Drug use: Never    Sexual activity: Defer       Family History   Problem Relation Age of Onset    No Known Problems Father     No Known Problems Mother      "Prostate cancer Neg Hx        Objective    Temp 97.5 °F (36.4 °C)   Ht 165.1 cm (65\")   Wt 74.6 kg (164 lb 6.4 oz)   BMI 27.36 kg/m²     Physical Exam        Results for orders placed or performed in visit on 10/23/23   POC Urinalysis Dipstick, Multipro    Specimen: Urine   Result Value Ref Range    Color Yellow Yellow, Straw, Dark Yellow, [place]    Clarity, UA Clear Clear    Glucose, UA Negative Negative mg/dL    Bilirubin Negative Negative    Ketones, UA Negative Negative    Specific Gravity  1.020 1.005 - 1.030    Blood, UA Negative Negative    pH, Urine 6.0 5.0 - 8.0    Protein,  mg/dL (A) Negative mg/dL    Urobilinogen, UA 0.2 E.U./dL Normal, 0.2 E.U./dL    Nitrite, UA Negative Negative    Leukocytes Negative Negative     IPSS Questionnaire (AUA-7):  Incomplete emptying  Over the past month, how often have you had a sensation of not emptying your bladder completely after you finished urinating?: Less than 1 time in 5 (10/23/23 0825)  Frequency  Over the past month, how often have you had to urinate again less than two hours after you finishied urinating ?: Less than half the time (10/23/23 0825)  Intermittency  Over the past month, how often have you found you stopped and started again several time when you urinated ?: About half the time (10/23/23 0825)  Urgency  Over the last month, how often have you found it difficult  have you found it difficult to postpone urination ?: More than half the time (10/23/23 0825)  Weak Stream  Over the past month, how often have you had a weak urinary stream ?: About half the time (10/23/23 0825)  Straining  Over the past month, how often have you had to push or strain to begin urination ?: Less than half the time (10/23/23 0825)  Nocturia  Over the past month, how many times did you most typically get up to urinate from the time you went to bed until the time you got up in the morning ?: 2 times (10/23/23 0825)  Quality of life due to urinary symptoms  If you were to " spend the rest of your life with your urinary condition the way it is now, how would feel about that?: Mostly satisfied (10/23/23 0825)    Scores  Total IPSS Score: (!) 17 (10/23/23 0825)  Total Score = Symptomatic Level: Moderately symptomatic: 8-19 (10/23/23 0825)        Assessment and Plan    Diagnoses and all orders for this visit:    1. Prostate cancer (Primary)  -     POC Urinalysis Dipstick, Multipro    2. Lower urinary tract symptoms (LUTS)    3. Impotence of organic origin            Status post SBRT completed in November 2020 at [place].  PSA has responded nicely.     AUA 17/35.  Patient not interested in pharmacologic therapy.       He will follow-up in 1 year with PSA.

## 2023-10-23 ENCOUNTER — OFFICE VISIT (OUTPATIENT)
Dept: UROLOGY | Facility: CLINIC | Age: 83
End: 2023-10-23
Payer: MEDICARE

## 2023-10-23 ENCOUNTER — TELEPHONE (OUTPATIENT)
Dept: UROLOGY | Facility: CLINIC | Age: 83
End: 2023-10-23
Payer: MEDICARE

## 2023-10-23 VITALS — TEMPERATURE: 97.5 F | WEIGHT: 164.4 LBS | BODY MASS INDEX: 27.39 KG/M2 | HEIGHT: 65 IN

## 2023-10-23 DIAGNOSIS — C61 PROSTATE CANCER: Primary | ICD-10-CM

## 2023-10-23 DIAGNOSIS — N52.9 IMPOTENCE OF ORGANIC ORIGIN: ICD-10-CM

## 2023-10-23 DIAGNOSIS — R39.9 LOWER URINARY TRACT SYMPTOMS (LUTS): ICD-10-CM

## 2023-10-23 LAB
BILIRUB BLD-MCNC: NEGATIVE MG/DL
CLARITY, POC: CLEAR
COLOR UR: YELLOW
GLUCOSE UR STRIP-MCNC: NEGATIVE MG/DL
KETONES UR QL: NEGATIVE
LEUKOCYTE EST, POC: NEGATIVE
NITRITE UR-MCNC: NEGATIVE MG/ML
PH UR: 6 [PH] (ref 5–8)
PROT UR STRIP-MCNC: ABNORMAL MG/DL
RBC # UR STRIP: NEGATIVE /UL
SP GR UR: 1.02 (ref 1–1.03)
UROBILINOGEN UR QL: ABNORMAL

## 2023-10-23 PROCEDURE — 81001 URINALYSIS AUTO W/SCOPE: CPT | Performed by: UROLOGY

## 2023-10-23 PROCEDURE — 99213 OFFICE O/P EST LOW 20 MIN: CPT | Performed by: UROLOGY

## 2023-10-23 PROCEDURE — 1159F MED LIST DOCD IN RCRD: CPT | Performed by: UROLOGY

## 2023-10-23 PROCEDURE — 1160F RVW MEDS BY RX/DR IN RCRD: CPT | Performed by: UROLOGY

## 2023-10-23 RX ORDER — CILOSTAZOL 50 MG/1
TABLET ORAL
COMMUNITY
Start: 2023-08-31

## 2023-10-23 RX ORDER — TADALAFIL 5 MG/1
TABLET ORAL
COMMUNITY
Start: 2023-07-11

## 2023-10-23 RX ORDER — AZELASTINE HCL 205.5 UG/1
SPRAY NASAL
COMMUNITY

## 2023-10-23 RX ORDER — SILDENAFIL CITRATE 20 MG/1
20 TABLET ORAL DAILY PRN
COMMUNITY
Start: 2023-07-11

## 2023-10-23 NOTE — TELEPHONE ENCOUNTER
"Called and spoke with patient's wife that after speaking with Dr. Vazquez and Ginny- that our providers are unable to sign paperwork due to the fact that they cannot pinpoint that the chemicals caused the cancer.  I apologized to her, but it is [facility] policy and none of our doctors are able to sign this kind of paperwork.  Wife stated, \"well I am sorry too, you are supposed to help us and you won't.\"  Expressed how sorry I was again. The encounter then ended.  "

## 2023-10-23 NOTE — TELEPHONE ENCOUNTER
Patient and his wife were here for a visit with Dr. Vazquez and stopped at check-out. They had paper work from the [facility] that needed to be reviewed and signed. It is for his prostate cancer. I made a copy of the paperwork and gave it to Arabella. I let the patient know once it is completed, she will give them a call.

## 2024-02-13 ENCOUNTER — TELEPHONE (OUTPATIENT)
Dept: OTOLARYNGOLOGY | Facility: CLINIC | Age: 84
End: 2024-02-13
Payer: MEDICARE

## 2024-04-10 ENCOUNTER — OFFICE VISIT (OUTPATIENT)
Dept: OTOLARYNGOLOGY | Facility: CLINIC | Age: 84
End: 2024-04-10
Payer: MEDICARE

## 2024-04-10 VITALS — WEIGHT: 166 LBS | HEIGHT: 65 IN | BODY MASS INDEX: 27.66 KG/M2 | TEMPERATURE: 97.7 F

## 2024-04-10 DIAGNOSIS — H61.23 BILATERAL IMPACTED CERUMEN: Primary | ICD-10-CM

## 2024-04-10 NOTE — PROGRESS NOTES
Ear Microscopy with Bilateral Cerumen Removal    Date/Time: 4/10/2024 10:19 AM    Performed by: Nicole Franklin APRN  Authorized by: Nicole Franklin APRN    Ear examination was performed utilizing binocular microscopy.  Right auricle:   normal:   Right ear canal:   impacted cerumen.   Left auricle:   normal:   Left ear canal:   impacted cerumen.     Procedure:    Cerumen was removed from the bilateral ears with a curette and a suction.   Post-procedure details:     Inspection after the procedure revealed an intact TM.    No medication was applied to the ear canal.    The procedure was tolerated well, no immediate complications.

## 2024-10-10 ENCOUNTER — LAB (OUTPATIENT)
Dept: LAB | Facility: HOSPITAL | Age: 84
End: 2024-10-10
Payer: MEDICARE

## 2024-10-10 DIAGNOSIS — C61 PROSTATE CANCER: ICD-10-CM

## 2024-10-10 DIAGNOSIS — C61 PROSTATE CANCER: Primary | ICD-10-CM

## 2024-10-10 LAB — PSA SERPL-MCNC: 0.03 NG/ML (ref 0–4)

## 2024-10-10 PROCEDURE — 84153 ASSAY OF PSA TOTAL: CPT

## 2024-10-10 PROCEDURE — 36415 COLL VENOUS BLD VENIPUNCTURE: CPT

## 2024-10-15 NOTE — PROGRESS NOTES
Subjective    Mr. Granados is 84 y.o. male    Chief Complaint: Prostate cancer    History of Present Illness  Patient with history of prostate cancer diagnosed June 2020.  [place] 3+4 equal 7 in 1 out of 7 cores.  Initial PSA was 11.9.  He underwent SBRT therapy in [place] completed in November 2020.  Patient also with erectile dysfunction he takes daily Cialis as well as Viagra as needed.  His most recent PSA is 0.028 compared to 0.042 a year ago.  Patient denies any new or worsening symptoms.  Lab Results   Component Value Date    PSA 0.028 10/10/2024    PSA 0.042 10/17/2023    PSA <0.10 07/11/2023       The following portions of the patient's history were reviewed and updated as appropriate: allergies, current medications, past family history, past medical history, past social history, past surgical history and problem list.    Review of Systems   Constitutional: Negative.    Genitourinary: Negative.          Current Outpatient Medications:     aspirin 81 MG EC tablet, Take 1 tablet by mouth Every Other Day., Disp: , Rfl:     azelastine (ASTEPRO) 0.15 % solution nasal spray, Spray 1 spray twice a day by intranasal route., Disp: , Rfl:     Calcium Citrate-Vitamin D (CALCIUM + D PO), Take 1 tablet by mouth 2 (Two) Times a Week., Disp: , Rfl:     Cholecalciferol (VITAMIN D3) 1000 UNITS capsule, Take 2 capsules by mouth Daily., Disp: , Rfl:     cilostazol (PLETAL) 50 MG tablet, Take 1 tablet twice a day by oral route., Disp: , Rfl:     Cyanocobalamin (VITAMIN B-12 IJ), Inject  as directed Every 30 (Thirty) Days., Disp: , Rfl:     Diclofenac Sodium (VOLTAREN) 1 % gel gel, APPLY 2 GRAMS TO THE AFFECTED AREA(S) BY TOPICAL ROUTE 2 TIMES PER DAY PRN, Disp: , Rfl:     fluticasone (FLONASE) 50 MCG/ACT nasal spray, 2 sprays into the nostril(s) as directed by provider Daily., Disp: 16 g, Rfl: 11    fosinopril (MONOPRIL) 40 MG tablet, Take 1 tablet by mouth Daily., Disp: , Rfl:     hydrochlorothiazide (HYDRODIURIL) 25 MG  tablet, Take 1 tablet by mouth Daily., Disp: , Rfl:     LEVOTHYROXINE SODIUM PO, Take 75 mcg by mouth Daily., Disp: , Rfl:     lovastatin (MEVACOR) 20 MG tablet, Take 1 tablet by mouth Every Night., Disp: , Rfl:     metFORMIN (GLUCOPHAGE) 500 MG tablet, Take 1 tablet by mouth Daily With Breakfast., Disp: , Rfl:     Multiple Vitamins-Minerals (OCUVITE PO), Take 1 tablet by mouth Daily., Disp: , Rfl:     sildenafil (REVATIO) 20 MG tablet, Take 1 tablet by mouth Daily As Needed., Disp: , Rfl:     tadalafil (Cialis) 5 MG tablet, Take 1 tablet by mouth Daily As Needed., Disp: , Rfl:     Chlorpheniramine-DM (CORICIDIN COUGH/COLD) 4-30 MG tablet, Take 1 tablet by mouth 2 (Two) Times a Day As Needed (congestion and cough). (Patient not taking: Reported on 4/10/2024), Disp: 12 each, Rfl: 0    Past Medical History:   Diagnosis Date    Arthritis     BPH (benign prostatic hypertrophy)     Diabetes mellitus     Elevated PSA     Hyperlipemia     Hypertension     Hypothyroidism     Impotence of organic origin     Pernicious anemia     PONV (postoperative nausea and vomiting)     Prostate CA        Past Surgical History:   Procedure Laterality Date    ARM LESION/CYST EXCISION Left 7/13/2020    Procedure: WIDE EXCISION SKIN LESION LEFT FOREARM;  Surgeon: Yoli Joshi MD;  Location: [facility] OR;  Service: General;  Laterality: Left;    CATARACT EXTRACTION      COLONOSCOPY      HERNIA REPAIR      PROSTATE BIOPSY N/A 4/3/2020    Procedure: PROSTATE ULTRASOUND BIOPSY MRI FUSION WITH URONAV;  Surgeon: Robert Álvarez MD;  Location: [facility] OR;  Service: Urology;  Laterality: N/A;    PROSTATE SURGERY      SPLENECTOMY  2000       Social History     Socioeconomic History    Marital status:    Tobacco Use    Smoking status: Never    Smokeless tobacco: Never   Vaping Use    Vaping status: Never Used   Substance and Sexual Activity    Alcohol use: No    Drug use: Never    Sexual activity: Defer       Family History   Problem  "Relation Age of Onset    No Known Problems Father     No Known Problems Mother     Prostate cancer Neg Hx        Objective    Temp 97.5 °F (36.4 °C)   Ht 165.1 cm (65\")   Wt 77.1 kg (170 lb)   BMI 28.29 kg/m²     Physical Exam  Constitutional:       Appearance: Normal appearance.   HENT:      Head: Normocephalic and atraumatic.   Pulmonary:      Effort: Pulmonary effort is normal.   Skin:     Coloration: Skin is not pale.   Neurological:      Mental Status: He is alert.   Psychiatric:         Mood and Affect: Mood normal.         Behavior: Behavior normal.             Results for orders placed or performed in visit on 10/24/24   POC Urinalysis Dipstick, Multipro    Collection Time: 10/24/24  9:17 AM    Specimen: Urine   Result Value Ref Range    Color Yellow Yellow, Straw, Dark Yellow, [place]    Clarity, UA Clear Clear    Glucose, UA Negative Negative mg/dL    Bilirubin Negative Negative    Ketones, UA Negative Negative    Specific Gravity  1.015 1.005 - 1.030    Blood, UA Negative Negative    pH, Urine 7.0 5.0 - 8.0    Protein, POC Negative Negative mg/dL    Urobilinogen, UA 0.2 E.U./dL Normal, 0.2 E.U./dL    Nitrite, UA Negative Negative    Leukocytes Negative Negative     Assessment and Plan    Diagnoses and all orders for this visit:    1. Prostate cancer (Primary)  -     POC Urinalysis Dipstick, Multipro  -     PSA DIAGNOSTIC; Future    2. Impotence due to erectile dysfunction      Patient with [place] 3+4 equal 7 adenocarcinoma prostate he underwent external beam radiation in 2020.  His PSA is considered nondetectable at 0.028.  Patient denies any new or worsening voiding symptoms or complaints.    Patient takes daily Cialis as well as as needed Viagra which has been effective for his ED.    His urine is clear.  Follow-up 1 year PSA prior.          "

## 2024-10-24 ENCOUNTER — OFFICE VISIT (OUTPATIENT)
Dept: UROLOGY | Facility: CLINIC | Age: 84
End: 2024-10-24
Payer: MEDICARE

## 2024-10-24 VITALS — WEIGHT: 170 LBS | HEIGHT: 65 IN | BODY MASS INDEX: 28.32 KG/M2 | TEMPERATURE: 97.5 F

## 2024-10-24 DIAGNOSIS — C61 PROSTATE CANCER: Primary | ICD-10-CM

## 2024-10-24 DIAGNOSIS — N52.9 IMPOTENCE DUE TO ERECTILE DYSFUNCTION: ICD-10-CM

## 2025-04-10 ENCOUNTER — OFFICE VISIT (OUTPATIENT)
Dept: OTOLARYNGOLOGY | Facility: CLINIC | Age: 85
End: 2025-04-10
Payer: MEDICARE

## 2025-04-10 VITALS — WEIGHT: 170 LBS | TEMPERATURE: 98 F | BODY MASS INDEX: 28.32 KG/M2 | HEIGHT: 65 IN | RESPIRATION RATE: 19 BRPM

## 2025-04-10 DIAGNOSIS — H61.23 BILATERAL IMPACTED CERUMEN: Primary | ICD-10-CM

## 2025-04-10 NOTE — PROGRESS NOTES
Ear Microscopy with Bilateral Cerumen Removal    Date/Time: 4/10/2025 10:35 AM    Performed by: Nicole Franklin APRN  Authorized by: Nicole Franklin APRN    Ear examination was performed utilizing binocular microscopy.  Right auricle:   normal:   Right ear canal:   impacted cerumen.   Left auricle:   normal:   Left ear canal:   impacted cerumen.     Procedure:    Cerumen was removed from the bilateral ears with a curette and a suction.   Post-procedure details:     Inspection after the procedure revealed an intact TM.    No medication was applied to the ear canal.    The procedure was tolerated well, no immediate complications.

## (undated) DEVICE — GUIDE NDL BIOP BIPLANE 17G STRL 1P/U

## (undated) DEVICE — HLDR PROB URONAV

## (undated) DEVICE — TOWEL,OR,DSP,ST,BLUE,STD,4/PK,20PK/CS: Brand: MEDLINE

## (undated) DEVICE — APPL CHLORAPREP HI/LITE 26ML ORNG

## (undated) DEVICE — PK TURNOVER RM ADV

## (undated) DEVICE — MARKR SKIN W/RULR AND LBL

## (undated) DEVICE — GLV SURG BIOGEL M LTX PF 7 1/2

## (undated) DEVICE — MAX-CORE® DISPOSABLE CORE BIOPSY INSTRUMENT, 18G X 25CM: Brand: MAX-CORE

## (undated) DEVICE — PAD MINOR UNIVERSAL: Brand: MEDLINE INDUSTRIES, INC.

## (undated) DEVICE — STAPLER, SKIN, 35W, A: Brand: MEDLINE INDUSTRIES, INC.

## (undated) DEVICE — SUT SILK 2/0 SUTUPAK TIES 24IN SA75H

## (undated) DEVICE — PAD,PREPPING,CUFFED,24X48,7",NONSTERILE: Brand: MEDLINE

## (undated) DEVICE — SPNG GZ WOVN 4X4IN 12PLY 10/BX STRL